# Patient Record
Sex: MALE | Race: WHITE | NOT HISPANIC OR LATINO | Employment: FULL TIME | RURAL
[De-identification: names, ages, dates, MRNs, and addresses within clinical notes are randomized per-mention and may not be internally consistent; named-entity substitution may affect disease eponyms.]

---

## 2022-05-18 ENCOUNTER — OFFICE VISIT (OUTPATIENT)
Dept: FAMILY MEDICINE CLINIC | Facility: CLINIC | Age: 72
End: 2022-05-18

## 2022-05-18 VITALS
BODY MASS INDEX: 26.88 KG/M2 | WEIGHT: 192 LBS | HEIGHT: 71 IN | SYSTOLIC BLOOD PRESSURE: 142 MMHG | DIASTOLIC BLOOD PRESSURE: 82 MMHG | OXYGEN SATURATION: 98 % | HEART RATE: 72 BPM

## 2022-05-18 DIAGNOSIS — E11.9 TYPE 2 DIABETES MELLITUS WITHOUT COMPLICATION, WITHOUT LONG-TERM CURRENT USE OF INSULIN: Primary | ICD-10-CM

## 2022-05-18 LAB — POC MICROALBUMIN URINE: 20

## 2022-05-18 PROCEDURE — 36415 COLL VENOUS BLD VENIPUNCTURE: CPT | Performed by: PHYSICIAN ASSISTANT

## 2022-05-18 PROCEDURE — 82044 UR ALBUMIN SEMIQUANTITATIVE: CPT | Performed by: PHYSICIAN ASSISTANT

## 2022-05-18 PROCEDURE — 99213 OFFICE O/P EST LOW 20 MIN: CPT | Performed by: PHYSICIAN ASSISTANT

## 2022-05-18 RX ORDER — METFORMIN HYDROCHLORIDE 500 MG/1
TABLET, EXTENDED RELEASE ORAL
COMMUNITY
Start: 2022-04-12 | End: 2022-05-18 | Stop reason: SDUPTHER

## 2022-05-18 RX ORDER — OMEPRAZOLE 20 MG/1
20 CAPSULE, DELAYED RELEASE ORAL DAILY
COMMUNITY
Start: 2022-04-13 | End: 2022-07-11

## 2022-05-18 RX ORDER — LANCETS
EACH MISCELLANEOUS
COMMUNITY
Start: 2022-05-13 | End: 2022-08-11

## 2022-05-18 RX ORDER — LISINOPRIL 5 MG/1
5 TABLET ORAL DAILY
COMMUNITY
Start: 2022-04-13 | End: 2022-07-13

## 2022-05-18 RX ORDER — BLOOD SUGAR DIAGNOSTIC
STRIP MISCELLANEOUS DAILY
COMMUNITY
Start: 2022-02-21 | End: 2022-09-26

## 2022-05-18 RX ORDER — METFORMIN HYDROCHLORIDE 500 MG/1
TABLET, EXTENDED RELEASE ORAL
Qty: 360 TABLET | Refills: 1 | Status: SHIPPED | OUTPATIENT
Start: 2022-05-18 | End: 2022-08-18 | Stop reason: SDUPTHER

## 2022-05-19 LAB
ALBUMIN SERPL-MCNC: 4.7 G/DL (ref 3.8–4.8)
ALBUMIN/GLOB SERPL: 2 {RATIO} (ref 1.2–2.2)
ALP SERPL-CCNC: 76 IU/L (ref 44–121)
ALT SERPL-CCNC: 43 IU/L (ref 0–44)
AST SERPL-CCNC: 45 IU/L (ref 0–40)
BILIRUB SERPL-MCNC: 2.3 MG/DL (ref 0–1.2)
BUN SERPL-MCNC: 8 MG/DL (ref 8–27)
BUN/CREAT SERPL: 10 (ref 10–24)
CALCIUM SERPL-MCNC: 9.8 MG/DL (ref 8.6–10.2)
CHLORIDE SERPL-SCNC: 102 MMOL/L (ref 96–106)
CO2 SERPL-SCNC: 22 MMOL/L (ref 20–29)
CREAT SERPL-MCNC: 0.83 MG/DL (ref 0.76–1.27)
EGFRCR SERPLBLD CKD-EPI 2021: 95 ML/MIN/1.73
GLOBULIN SER CALC-MCNC: 2.4 G/DL (ref 1.5–4.5)
GLUCOSE SERPL-MCNC: 159 MG/DL (ref 65–99)
HBA1C MFR BLD: 7.3 % (ref 4.8–5.6)
POTASSIUM SERPL-SCNC: 3.9 MMOL/L (ref 3.5–5.2)
PROT SERPL-MCNC: 7.1 G/DL (ref 6–8.5)
SODIUM SERPL-SCNC: 143 MMOL/L (ref 134–144)

## 2022-05-30 ENCOUNTER — TELEPHONE (OUTPATIENT)
Dept: FAMILY MEDICINE CLINIC | Facility: CLINIC | Age: 72
End: 2022-05-30

## 2022-05-30 NOTE — TELEPHONE ENCOUNTER
Please let him know that his 3-month sugar average has improved down to 7.3.  The goal is to get less than 7, so please continue to work on improving healthy low-carb diet and exercise with medication.  We will recheck again with his next labs in 3 months.  There also was increase to his bilirubin level at 2.3 normal is less than 1.2 and mild elevation to one  liver enzyme at 45 with normal less than 40.  Recommend to get in with GI for further evaluation on elevated bilirubin.  Please see whom would like to see.  Thanks.

## 2022-06-01 DIAGNOSIS — R17 ELEVATED BILIRUBIN: Primary | ICD-10-CM

## 2022-07-11 RX ORDER — OMEPRAZOLE 20 MG/1
CAPSULE, DELAYED RELEASE ORAL
Qty: 90 CAPSULE | Refills: 0 | Status: SHIPPED | OUTPATIENT
Start: 2022-07-11 | End: 2022-08-18 | Stop reason: SDUPTHER

## 2022-07-13 RX ORDER — LISINOPRIL 5 MG/1
TABLET ORAL
Qty: 90 TABLET | Refills: 0 | Status: SHIPPED | OUTPATIENT
Start: 2022-07-13 | End: 2022-08-18 | Stop reason: SDUPTHER

## 2022-07-29 ENCOUNTER — TELEPHONE (OUTPATIENT)
Dept: FAMILY MEDICINE CLINIC | Facility: CLINIC | Age: 72
End: 2022-07-29

## 2022-07-29 NOTE — TELEPHONE ENCOUNTER
[facility]  [facility]  [phone]    Insurance company contacted us. Said that they wanted to let providers know that they recommend Pt be placed on statin medication due to diabetes and age. Wanted a message left for PCP.

## 2022-08-11 RX ORDER — LANCETS
EACH MISCELLANEOUS
Qty: 100 EACH | Refills: 2 | Status: SHIPPED | OUTPATIENT
Start: 2022-08-11

## 2022-08-18 ENCOUNTER — OFFICE VISIT (OUTPATIENT)
Dept: FAMILY MEDICINE CLINIC | Facility: CLINIC | Age: 72
End: 2022-08-18

## 2022-08-18 VITALS
HEART RATE: 88 BPM | BODY MASS INDEX: 23.8 KG/M2 | HEIGHT: 71 IN | SYSTOLIC BLOOD PRESSURE: 126 MMHG | OXYGEN SATURATION: 99 % | WEIGHT: 170 LBS | DIASTOLIC BLOOD PRESSURE: 76 MMHG

## 2022-08-18 DIAGNOSIS — Z12.5 PROSTATE CANCER SCREENING: ICD-10-CM

## 2022-08-18 DIAGNOSIS — D53.1 MEGALOBLASTIC ANEMIA: ICD-10-CM

## 2022-08-18 DIAGNOSIS — E78.2 MIXED HYPERLIPIDEMIA: ICD-10-CM

## 2022-08-18 DIAGNOSIS — R53.83 FATIGUE, UNSPECIFIED TYPE: ICD-10-CM

## 2022-08-18 DIAGNOSIS — E11.9 TYPE 2 DIABETES MELLITUS WITHOUT COMPLICATION, WITHOUT LONG-TERM CURRENT USE OF INSULIN: ICD-10-CM

## 2022-08-18 DIAGNOSIS — E55.9 VITAMIN D DEFICIENCY: ICD-10-CM

## 2022-08-18 DIAGNOSIS — Z00.00 ROUTINE MEDICAL EXAM: Primary | ICD-10-CM

## 2022-08-18 PROCEDURE — 99397 PER PM REEVAL EST PAT 65+ YR: CPT | Performed by: PHYSICIAN ASSISTANT

## 2022-08-18 PROCEDURE — 36415 COLL VENOUS BLD VENIPUNCTURE: CPT | Performed by: PHYSICIAN ASSISTANT

## 2022-08-18 RX ORDER — OMEPRAZOLE 20 MG/1
20 CAPSULE, DELAYED RELEASE ORAL DAILY
Qty: 90 CAPSULE | Refills: 1 | Status: SHIPPED | OUTPATIENT
Start: 2022-08-18 | End: 2023-02-03 | Stop reason: SDUPTHER

## 2022-08-18 RX ORDER — METFORMIN HYDROCHLORIDE 500 MG/1
TABLET, EXTENDED RELEASE ORAL
Qty: 360 TABLET | Refills: 1 | Status: SHIPPED | OUTPATIENT
Start: 2022-08-18 | End: 2023-02-03

## 2022-08-18 RX ORDER — LISINOPRIL 5 MG/1
5 TABLET ORAL DAILY
Qty: 90 TABLET | Refills: 1 | Status: SHIPPED | OUTPATIENT
Start: 2022-08-18 | End: 2023-02-03 | Stop reason: SDUPTHER

## 2022-08-18 NOTE — PROGRESS NOTES
"Chief Complaint  Annual Exam (Yearly physical )    Subjective          Dakotah Verma presents to [facility]  Patient in today for routine physical exam along with medication recheck and refills.  He has been working on improving his diet and exercise these last several months.  He states he is pretty much cut out carbs and animal products.  He has lost about 20 pounds.  His sugars have shown significant improvement as well as he has been monitoring his blood pressure and it has been staying very well controlled.  He denies any chest pain or shortness of breath.  He did go see GI and made the decision to decline a colonoscopy at this time.  He denies any changes to bowel or urine habits.  States the omeprazole continues to work well for his reflux symptoms and would like to continue.    Heartburn  He reports no abdominal pain, no chest pain, no coughing, no nausea or no sore throat. Pertinent negatives include no fatigue.   Diabetes  He presents for his follow-up diabetic visit. He has type 2 diabetes mellitus. Pertinent negatives for hypoglycemia include no dizziness. Pertinent negatives for diabetes include no blurred vision, no chest pain, no fatigue, no polydipsia, no polyphagia and no polyuria.       Objective   Vital Signs:   /76   Pulse 88   Ht 180.3 cm (71\")   Wt 77.1 kg (170 lb)   SpO2 99%   BMI 23.71 kg/m²     Body mass index is 23.71 kg/m².    Review of Systems   Constitutional: Negative for fatigue and fever.   HENT: Negative for congestion and sore throat.    Eyes: Negative for blurred vision.   Respiratory: Negative for cough and shortness of breath.    Cardiovascular: Negative for chest pain.   Gastrointestinal: Negative for abdominal pain, blood in stool, diarrhea, nausea and vomiting.   Endocrine: Negative for polydipsia, polyphagia and polyuria.   Genitourinary: Negative for dysuria and frequency.   Neurological: Negative for dizziness and headache. "       Past History:  Medical History: has a past medical history of Backache, Diabetes mellitus (HCC), Drug therapy, Fatty liver (1997), GERD with esophagitis, High risk medication use, Megaloblastic anemia, Mixed hyperlipidemia due to type 2 diabetes mellitus (HCC), Prostatitis, Proteinuria due to type 2 diabetes mellitus (HCC), Sciatica, left side, Tonsillitis, Type 2 diabetes mellitus (HCC), and Vitamin D deficiency.   Surgical History: has a past surgical history that includes Back surgery; Adenoidectomy; Tonsillectomy; and Lumbar discectomy (10/30/2014).   Family History: family history includes Asthma in his sister; COPD in his father; Diabetes type II in his mother and sister; Hypertension in his mother; Mental illness in his mother; Other in his sister; Stroke in his mother.   Social History: reports that he has never smoked. He has never used smokeless tobacco. He reports that he does not drink alcohol and does not use drugs.      Current Outpatient Medications:   •  Accu-Chek Leah Plus test strip, Daily. for testing, Disp: , Rfl:   •  Accu-Chek Softclix Lancets lancets, CHECK BLOOD SUGAR EVERY DAY OR AS DIRECTED FOR DIABETES, Disp: 100 each, Rfl: 2  •  lisinopril (PRINIVIL,ZESTRIL) 5 MG tablet, Take 1 tablet by mouth Daily., Disp: 90 tablet, Rfl: 1  •  metFORMIN ER (GLUCOPHAGE-XR) 500 MG 24 hr tablet, Take 2 tablets by oral route in AM and 2 tablets by oral route in PM with meals, Disp: 360 tablet, Rfl: 1  •  omeprazole (priLOSEC) 20 MG capsule, Take 1 capsule by mouth Daily., Disp: 90 capsule, Rfl: 1  •  vitamin B-12 (CYANOCOBALAMIN) 1000 MCG tablet, Take 1,000 mcg by mouth daily., Disp: , Rfl:   Allergies: Sulfa antibiotics    Physical Exam  Constitutional:       Appearance: Normal appearance.   HENT:      Right Ear: Tympanic membrane normal.      Left Ear: Tympanic membrane normal.      Mouth/Throat:      Mouth: Mucous membranes are moist.   Eyes:      Pupils: Pupils are equal, round, and reactive to  light.   Cardiovascular:      Rate and Rhythm: Normal rate and regular rhythm.      Heart sounds: Normal heart sounds.   Pulmonary:      Effort: Pulmonary effort is normal.      Breath sounds: Normal breath sounds.   Abdominal:      Palpations: Abdomen is soft.   Neurological:      Mental Status: He is alert and oriented to person, place, and time.   Psychiatric:         Mood and Affect: Mood normal.         Behavior: Behavior normal.             Assessment and Plan   Diagnoses and all orders for this visit:    1. Routine medical exam (Primary)  Encouraged healthy diet and exercise.  We will check fasting labs today.  Return to clinic prior to recheck as needed.  2. Mixed hyperlipidemia  -     Comprehensive Metabolic Panel; Future  -     Lipid Panel; Future  Check fasting labs today.  Encouraged to continue with healthy diet and exercise.  3. Vitamin D deficiency  -     Vitamin D 25 Hydroxy; Future  Patient states has not been on vitamin D for the past 6 months.  4. Prostate cancer screening  -     PSA Screen; Future  PSA with labs today.  5. Type 2 diabetes mellitus without complication, without long-term current use of insulin (HCC)  -     Hemoglobin A1c; Future  Will check hemoglobin A1c today with labs.  His readings he has brought in for the last 2 months show that he has been averaging around 120 or better fasting.  6. Megaloblastic anemia  -     CBC & Differential; Future  -     Vitamin B12; Future  We will recheck B12 level today.  7. Fatigue, unspecified type  -     TSH; Future    Other orders  -     metFORMIN ER (GLUCOPHAGE-XR) 500 MG 24 hr tablet; Take 2 tablets by oral route in AM and 2 tablets by oral route in PM with meals  Dispense: 360 tablet; Refill: 1  -     omeprazole (priLOSEC) 20 MG capsule; Take 1 capsule by mouth Daily.  Dispense: 90 capsule; Refill: 1  -     lisinopril (PRINIVIL,ZESTRIL) 5 MG tablet; Take 1 tablet by mouth Daily.  Dispense: 90 tablet; Refill: 1            Follow Up   Return  for Medicare Wellness, schedule annual wellness with nurse.  Patient was given instructions and counseling regarding his condition or for health maintenance advice. Please see specific information pulled into the AVS if appropriate.     Nancy Coon PA-C

## 2022-08-19 LAB
25(OH)D3+25(OH)D2 SERPL-MCNC: 50.5 NG/ML (ref 30–100)
ALBUMIN SERPL-MCNC: 4.8 G/DL (ref 3.8–4.8)
ALBUMIN/GLOB SERPL: 1.7 {RATIO} (ref 1.2–2.2)
ALP SERPL-CCNC: 84 IU/L (ref 44–121)
ALT SERPL-CCNC: 31 IU/L (ref 0–44)
AST SERPL-CCNC: 35 IU/L (ref 0–40)
BASOPHILS # BLD AUTO: 0.1 X10E3/UL (ref 0–0.2)
BASOPHILS NFR BLD AUTO: 1 %
BILIRUB SERPL-MCNC: 2 MG/DL (ref 0–1.2)
BUN SERPL-MCNC: 12 MG/DL (ref 8–27)
BUN/CREAT SERPL: 13 (ref 10–24)
CALCIUM SERPL-MCNC: 10.1 MG/DL (ref 8.6–10.2)
CHLORIDE SERPL-SCNC: 99 MMOL/L (ref 96–106)
CHOLEST SERPL-MCNC: 143 MG/DL (ref 100–199)
CO2 SERPL-SCNC: 20 MMOL/L (ref 20–29)
CREAT SERPL-MCNC: 0.93 MG/DL (ref 0.76–1.27)
EGFRCR-CYS SERPLBLD CKD-EPI 2021: 89 ML/MIN/1.73
EOSINOPHIL # BLD AUTO: 0.1 X10E3/UL (ref 0–0.4)
EOSINOPHIL NFR BLD AUTO: 1 %
ERYTHROCYTE [DISTWIDTH] IN BLOOD BY AUTOMATED COUNT: 13.3 % (ref 11.6–15.4)
GLOBULIN SER CALC-MCNC: 2.9 G/DL (ref 1.5–4.5)
GLUCOSE SERPL-MCNC: 119 MG/DL (ref 65–99)
HBA1C MFR BLD: 6.6 % (ref 4.8–5.6)
HCT VFR BLD AUTO: 43.2 % (ref 37.5–51)
HDLC SERPL-MCNC: 43 MG/DL
HGB BLD-MCNC: 14.8 G/DL (ref 13–17.7)
IMM GRANULOCYTES # BLD AUTO: 0 X10E3/UL (ref 0–0.1)
IMM GRANULOCYTES NFR BLD AUTO: 0 %
LDLC SERPL CALC-MCNC: 79 MG/DL (ref 0–99)
LYMPHOCYTES # BLD AUTO: 1.9 X10E3/UL (ref 0.7–3.1)
LYMPHOCYTES NFR BLD AUTO: 23 %
MCH RBC QN AUTO: 32.2 PG (ref 26.6–33)
MCHC RBC AUTO-ENTMCNC: 34.3 G/DL (ref 31.5–35.7)
MCV RBC AUTO: 94 FL (ref 79–97)
MONOCYTES # BLD AUTO: 0.5 X10E3/UL (ref 0.1–0.9)
MONOCYTES NFR BLD AUTO: 6 %
NEUTROPHILS # BLD AUTO: 6 X10E3/UL (ref 1.4–7)
NEUTROPHILS NFR BLD AUTO: 69 %
PLATELET # BLD AUTO: 281 X10E3/UL (ref 150–450)
POTASSIUM SERPL-SCNC: 5.1 MMOL/L (ref 3.5–5.2)
PROT SERPL-MCNC: 7.7 G/DL (ref 6–8.5)
PSA SERPL-MCNC: 8.7 NG/ML (ref 0–4)
RBC # BLD AUTO: 4.6 X10E6/UL (ref 4.14–5.8)
SODIUM SERPL-SCNC: 142 MMOL/L (ref 134–144)
TRIGL SERPL-MCNC: 119 MG/DL (ref 0–149)
TSH SERPL DL<=0.005 MIU/L-ACNC: 2.63 UIU/ML (ref 0.45–4.5)
VIT B12 SERPL-MCNC: 486 PG/ML (ref 232–1245)
VLDLC SERPL CALC-MCNC: 21 MG/DL (ref 5–40)
WBC # BLD AUTO: 8.5 X10E3/UL (ref 3.4–10.8)

## 2022-08-29 DIAGNOSIS — R97.20 ELEVATED PSA: Primary | ICD-10-CM

## 2022-09-23 ENCOUNTER — OFFICE VISIT (OUTPATIENT)
Dept: UROLOGY | Facility: CLINIC | Age: 72
End: 2022-09-23

## 2022-09-23 VITALS — BODY MASS INDEX: 23.1 KG/M2 | HEIGHT: 71 IN | WEIGHT: 165 LBS

## 2022-09-23 DIAGNOSIS — R97.20 ELEVATED PROSTATE SPECIFIC ANTIGEN (PSA): Primary | ICD-10-CM

## 2022-09-23 PROCEDURE — 99204 OFFICE O/P NEW MOD 45 MIN: CPT | Performed by: STUDENT IN AN ORGANIZED HEALTH CARE EDUCATION/TRAINING PROGRAM

## 2022-09-23 NOTE — PROGRESS NOTES
"     Elevated PSA Office Visit      Patient Name: Dakotah Verma  : 1950   MRN: [id number]     Chief Complaint:  Elevated PSA.    Chief Complaint   Patient presents with   • Elevated PSA       Referring Provider: Nancy Coon PA-C    History of Present Illness: Dakotah Verma is a 71 y.o. male who presents today with history of elevated PSA.     He reports a history of \"prostatitis\" in his 20s and 40s, but \"has not had it since\". Previous symptoms included burning and penile discharge. These have resolved years ago.     Lab Results   Component Value Date    PSA 8.7 (H) 2022      Reports PSA 5.6 last year.  Reports he thinks PSA was in the 3's two years ago.   PSA records are limited.     Denies urinary symptoms concerns. Rare nocturia 1x nightly.     IPSS 1, minimal concerns.     He reports his paternal uncle \" of prostate cancer\".     Prostate Biopsy Collaborative Group (PBCG) Risk Calculator:          Subjective      Review of System: Review of Systems   Constitutional: Negative for chills, fatigue, fever and unexpected weight change.   HENT: Negative for sore throat.    Eyes: Negative for visual disturbance.   Respiratory: Negative for cough, chest tightness and shortness of breath.    Cardiovascular: Negative for chest pain and leg swelling.   Gastrointestinal: Negative for blood in stool, constipation, diarrhea, nausea, rectal pain and vomiting.   Genitourinary: Negative for decreased urine volume, difficulty urinating, dysuria, enuresis, flank pain, frequency, genital sores, hematuria and urgency.   Musculoskeletal: Negative for back pain and joint swelling.   Skin: Negative for rash and wound.   Neurological: Negative for seizures, speech difficulty, weakness and headaches.   Psychiatric/Behavioral: Negative for confusion, sleep disturbance and suicidal ideas. The patient is not nervous/anxious.       I have reviewed the ROS documented by my clinical staff, I have updated appropriately " and I agree. Evaristo Up MD    Past Medical History:   Past Medical History:   Diagnosis Date   • Backache    • Diabetes mellitus (HCC)    • Drug therapy    • Fatty liver 1997    ULTRASOUND   • GERD with esophagitis    • High risk medication use    • Megaloblastic anemia    • Mixed hyperlipidemia due to type 2 diabetes mellitus (HCC)    • Prostatitis    • Proteinuria due to type 2 diabetes mellitus (HCC)    • Sciatica, left side    • Tonsillitis    • Type 2 diabetes mellitus (HCC)    • Vitamin D deficiency        Past Surgical History:   Past Surgical History:   Procedure Laterality Date   • ADENOIDECTOMY     • BACK SURGERY     • LUMBAR DISCECTOMY  10/30/2014   • TONSILLECTOMY         Family History:   Family History   Problem Relation Age of Onset   • Diabetes type II Mother    • Hypertension Mother         PULMONARY   • Stroke Mother    • Mental illness Mother    • COPD Father    • Diabetes type II Sister    • Asthma Sister    • Other Sister         SPINAL BIFIDA       Social History:   Social History     Socioeconomic History   • Marital status:    Tobacco Use   • Smoking status: Never Smoker   • Smokeless tobacco: Never Used   Vaping Use   • Vaping Use: Never used   Substance and Sexual Activity   • Alcohol use: No   • Drug use: No   • Sexual activity: Not Currently       Medications:     Current Outpatient Medications:   •  Accu-Chek Leah Plus test strip, Daily. for testing, Disp: , Rfl:   •  Accu-Chek Softclix Lancets lancets, CHECK BLOOD SUGAR EVERY DAY OR AS DIRECTED FOR DIABETES, Disp: 100 each, Rfl: 2  •  lisinopril (PRINIVIL,ZESTRIL) 5 MG tablet, Take 1 tablet by mouth Daily., Disp: 90 tablet, Rfl: 1  •  metFORMIN ER (GLUCOPHAGE-XR) 500 MG 24 hr tablet, Take 2 tablets by oral route in AM and 2 tablets by oral route in PM with meals, Disp: 360 tablet, Rfl: 1  •  omeprazole (priLOSEC) 20 MG capsule, Take 1 capsule by mouth Daily., Disp: 90 capsule, Rfl: 1  •  vitamin B-12 (CYANOCOBALAMIN)  1000 MCG tablet, Take 1,000 mcg by mouth daily., Disp: , Rfl:     Allergies:   Allergies   Allergen Reactions   • Sulfa Antibiotics        IPSS Questionnaire (AUA-7):  Over the past month…    1)  Incomplete Emptying:       How often have you had a sensation of not emptying you had the sensation of not emptying your bladder completely after you finished urinating?  0 - Not at all   2)  Frequency:       How often have you had the urinate again less than two hours after you finished urinating?  0 - Not at all   3)  Intermittency:       How often have you found you stopped and started again several times when you urinated?   0 - Not at all   4) Urgency:      How often have you found it difficult to postpone urination?  0 - Not at all   5) Weak Stream:      How often have you had a weak urinary stream?  0 - Not at all   6) Straining:       How often have you had to push or strain to begin urination?  0 - Not at all   7) Nocturia:      How many times did you most typically get up to urinate from the time you went to bed at night until the time you got up in the morning?  1 - 1 time   Total Score:  1   The International Prostate Symptom Score (IPSS) is used to screen, diagnose, track symptoms of benign prostatic hyperplasia (BPH).   0-7 (Mild Symptoms) 8-19 (Moderate) 20-35 (Severe)   Quality of Life (QoL):  If you were to spend the rest of your life with your urinary condition just the way it is now, how would you feel about that? 0-Delighted   Urine Leakage (Incontinence) 0-No Leakage       Sexual Health Inventory for Men (ROHITH)   Over the past 6 months:     1. How do you rate your confidence that you could get and keep an erection?  0 - No sexual activity    2. When you had erections with sexual    stimulation, how often were your erections hard enough for penetration (entering your partner)?  0 - No Sexual Activity    3. During sexual intercourse, how often were you able to maintain your erection after you had  "penetrated (entered) your partner?  0 - Did not attempt intercourse   4. During sexual intercourse, how difficult was it to maintain your erection to completion of intercourse?  0 - Did not attempt intercourse   5. When you attempted sexual intercourse, how often was it satisfactory for you?  0 - Did not attempt intercourse    Total Score: 0   The Sexual Health Inventory for Men further classifies ED severity with the following breakpoints:   1-7 (Severe ED) 8-11 (Moderate ED) 12-16 (Mild to Moderate ED) 17-21 (Mild ED)             Objective     Physical Exam:   Vital Signs:   Vitals:    09/23/22 1055   Weight: 74.8 kg (165 lb)   Height: 180.3 cm (71\")     Body mass index is 23.01 kg/m².     Physical Exam  Constitutional:       Appearance: Normal appearance.   HENT:      Head: Normocephalic and atraumatic.      Nose: Nose normal.   Eyes:      Extraocular Movements: Extraocular movements intact.      Conjunctiva/sclera: Conjunctivae normal.      Pupils: Pupils are equal, round, and reactive to light.   Genitourinary:     Comments: Circumcised phallus, orthotopic meatus, bilaterally descended testicles without masses or lesions.  Digital rectal examination with normal tone, no blood, no obvious induration or nodules present.  Musculoskeletal:         General: Normal range of motion.      Cervical back: Normal range of motion and neck supple.   Skin:     General: Skin is warm and dry.      Findings: No lesion or rash.   Neurological:      General: No focal deficit present.      Mental Status: He is alert and oriented to person, place, and time. Mental status is at baseline.   Psychiatric:         Mood and Affect: Mood normal.         Behavior: Behavior normal.         Labs:   Hematocrit (%)   Date Value   08/18/2022 43.2   07/08/2016 41.3   07/08/2016 40.3   10/27/2014 46.3       Lab Results   Component Value Date    PSA 8.7 (H) 08/18/2022       Images:   No Images in the past 120 days found..    Measures:   Tobacco: " "  Dakotah Verma  reports that he has never smoked. He has never used smokeless tobacco.        Assessment / Plan      Assessment/Plan:   Mr. Verma is a 71 y.o. male with elevated PSA.    I had a detailed discussion with the patient today regarding prostate-specific antigen (PSA) and prostate cancer screening.  We discussed that PSA is an imperfect screening test and that it can be elevated for a variety of reasons including infection, inflammation, as a function of the prostate volume, and due to malignancy.  We discussed how prostate cancer is the most commonly diagnosed malignancy among men and becomes more prevalent at advanced age.  We discussed how patients with a family history of prostate cancer are at an increased risk of developing prostate cancer over the general population.  I counseled the patient that the American Urological Association guidelines recommend PSA screening in men aged 55 through 70, or in some instances at an earlier age if positive family history for prostate cancer.  I discussed how screening men older than 70 years old is not recommended, unless a patient has a greater than 10-year life expectancy, is in good health, and is personally motivated to rule out prostate cancer; this is due to the fact that most men older than 70, in poor health, and in those men with less than 10-year life expectancy will likely die of unrelated causes not associated with prostate cancer.  We talked about how prostate cancer is typically a slow-growing malignancy, but identifying intermediate or high risk prostate cancers that need prompt treatment is the ultimate goal of PSA and prostate cancer screening.  I discussed with this patient that there is no \"normal\" PSA range despite the fact that most labs indicate a \"normal\" PSA range from 0 to 4 ng/dL.  There are age-adjusted PSA ranges that are also taken into account in the setting of slightly elevated PSA in the older male.  Besides age-adjusted ranges, " calculating a patient's prostate volume to determine PSA density (<0.15 has a lower risk of harboring malignancy), calculating the patient's PSA velocity or doubling time, and evaluating free PSA versus total PSA values can help guide our decision making.  I also discussed the possibility of performing adjunctive blood tests as well, my preference is to perform a 4K score which can provide a percentage risk of harboring intermediate or high risk prostate cancers that may need treatment.  This is sometimes beneficial in assisting with decision-making in patients who are hesitant to undergo prostate biopsy.  I also discussed my goal is to work-up the appropriate patient for elevated PSA as prostate biopsy and work-up for prostate cancer has inherent risks and potential harms.  The risk of prostate biopsy related sepsis is 4% for all comers.    In short, I discussed that PSA screening and work-up for prostate cancer should only ever occur after shared decision making conversation between the physician and patient.  Patient reports understanding.    Discussion summary  I am a bit concerned about the patient's PSA trend per his report.  PSA records are limited.  No obvious induration or nodules present.  Discussed options including upfront biopsy, 4K score, select MDX urinary screening, possible MRI prostate for further evaluation.  Patient is interested in select MDX urinary prostate cancer screening and I will call him with results and next steps.    Diagnoses and all orders for this visit:    1. Elevated prostate specific antigen (PSA) (Primary)     - SelectM[facility]X urinary screening test for PCa, will call with results          Follow Up:   No follow-ups on file.    I spent approximately 30 minutes providing clinical care for this patient; including review of patient's chart and provider documentation, face to face time spent with patient in examination room (obtaining history, performing physical exam, discussing  diagnosis and management options), placing orders, and completing patient documentation.     Evaristo Up MD  [facility] [place]

## 2022-09-26 ENCOUNTER — TELEPHONE (OUTPATIENT)
Dept: UROLOGY | Facility: CLINIC | Age: 72
End: 2022-09-26

## 2022-09-26 DIAGNOSIS — R97.20 ELEVATED PROSTATE SPECIFIC ANTIGEN (PSA): Primary | ICD-10-CM

## 2022-09-26 RX ORDER — BLOOD SUGAR DIAGNOSTIC
STRIP MISCELLANEOUS
Qty: 100 EACH | Refills: 3 | Status: SHIPPED | OUTPATIENT
Start: 2022-09-26

## 2022-09-26 NOTE — TELEPHONE ENCOUNTER
I called the patient with results of select MDX urinary prostate cancer screening test, this indicates 66% chance of finding any prostate cancer and a 38% chance of finding clinically meaningful [place] 7 or higher prostate cancer.  He was offered upfront biopsy versus MRI for possible [facility] MRI-ultrasound fusion prostate biopsy in the operating room under anesthesia.  The patient would like to proceed with MRI prostate for further risk characterization.    Plan  - MRI prostate with [employer] CAD software for possible fusion ultrasound/MRI prostate biopsy, please schedule at [facility] next avaialable  - will call with results    Evaristo Up MD

## 2022-10-22 ENCOUNTER — TELEPHONE (OUTPATIENT)
Dept: UROLOGY | Facility: CLINIC | Age: 72
End: 2022-10-22

## 2022-10-22 ENCOUNTER — PREP FOR SURGERY (OUTPATIENT)
Dept: OTHER | Facility: HOSPITAL | Age: 72
End: 2022-10-22

## 2022-10-22 DIAGNOSIS — R97.20 ELEVATED PROSTATE SPECIFIC ANTIGEN (PSA): Primary | ICD-10-CM

## 2022-10-22 RX ORDER — CIPROFLOXACIN 500 MG/1
500 TABLET, FILM COATED ORAL 2 TIMES DAILY
Qty: 6 TABLET | Refills: 0 | Status: SHIPPED | OUTPATIENT
Start: 2022-11-03 | End: 2022-11-06

## 2022-10-22 RX ORDER — CEFAZOLIN SODIUM 2 G/100ML
2 INJECTION, SOLUTION INTRAVENOUS ONCE
Status: CANCELLED | OUTPATIENT
Start: 2022-10-22 | End: 2022-10-22

## 2022-10-22 NOTE — TELEPHONE ENCOUNTER
I called the patient back with results of MRI prostate, this demonstrates PI-RADS 4 lesion in the left peripheral zone and PI-RADS 3 lesion in the right transition zone.  We will plan for MRI/ultrasound fusion biopsy in the main operating room 11-4-2022.  I will send ciprofloxacin to his pharmacy.  We discussed risk benefits and alternatives including bleeding in the stool, rectum, urine, ejaculate and sepsis 2 to 3% risk.    PLAN  - MRI/US fusion prostate biopsy 11/4/22 Main OR     MD Evaristo Quesada MD

## 2022-11-02 ENCOUNTER — PRE-ADMISSION TESTING (OUTPATIENT)
Dept: PREADMISSION TESTING | Facility: HOSPITAL | Age: 72
End: 2022-11-02

## 2022-11-02 VITALS — HEIGHT: 71 IN | BODY MASS INDEX: 23.38 KG/M2 | WEIGHT: 167 LBS

## 2022-11-02 DIAGNOSIS — R97.20 ELEVATED PROSTATE SPECIFIC ANTIGEN (PSA): ICD-10-CM

## 2022-11-02 LAB
ANION GAP SERPL CALCULATED.3IONS-SCNC: 13 MMOL/L (ref 5–15)
BUN SERPL-MCNC: 12 MG/DL (ref 8–23)
BUN/CREAT SERPL: 17.1 (ref 7–25)
CALCIUM SPEC-SCNC: 9.9 MG/DL (ref 8.6–10.5)
CHLORIDE SERPL-SCNC: 103 MMOL/L (ref 98–107)
CO2 SERPL-SCNC: 25 MMOL/L (ref 22–29)
CREAT SERPL-MCNC: 0.7 MG/DL (ref 0.76–1.27)
DEPRECATED RDW RBC AUTO: 46.3 FL (ref 37–54)
EGFRCR SERPLBLD CKD-EPI 2021: 99.1 ML/MIN/1.73
ERYTHROCYTE [DISTWIDTH] IN BLOOD BY AUTOMATED COUNT: 13 % (ref 12.3–15.4)
GLUCOSE SERPL-MCNC: 116 MG/DL (ref 65–99)
HBA1C MFR BLD: 5.8 % (ref 4.8–5.6)
HCT VFR BLD AUTO: 44.3 % (ref 37.5–51)
HGB BLD-MCNC: 14.8 G/DL (ref 13–17.7)
MCH RBC QN AUTO: 32.3 PG (ref 26.6–33)
MCHC RBC AUTO-ENTMCNC: 33.4 G/DL (ref 31.5–35.7)
MCV RBC AUTO: 96.7 FL (ref 79–97)
PLATELET # BLD AUTO: 299 10*3/MM3 (ref 140–450)
PMV BLD AUTO: 10.2 FL (ref 6–12)
POTASSIUM SERPL-SCNC: 4.4 MMOL/L (ref 3.5–5.2)
QT INTERVAL: 378 MS
QTC INTERVAL: 399 MS
RBC # BLD AUTO: 4.58 10*6/MM3 (ref 4.14–5.8)
SODIUM SERPL-SCNC: 141 MMOL/L (ref 136–145)
WBC NRBC COR # BLD: 8.96 10*3/MM3 (ref 3.4–10.8)

## 2022-11-02 PROCEDURE — 85027 COMPLETE CBC AUTOMATED: CPT

## 2022-11-02 PROCEDURE — 80048 BASIC METABOLIC PNL TOTAL CA: CPT

## 2022-11-02 PROCEDURE — 93005 ELECTROCARDIOGRAM TRACING: CPT

## 2022-11-02 PROCEDURE — 83036 HEMOGLOBIN GLYCOSYLATED A1C: CPT

## 2022-11-02 PROCEDURE — 36415 COLL VENOUS BLD VENIPUNCTURE: CPT

## 2022-11-02 PROCEDURE — 93010 ELECTROCARDIOGRAM REPORT: CPT | Performed by: STUDENT IN AN ORGANIZED HEALTH CARE EDUCATION/TRAINING PROGRAM

## 2022-11-02 NOTE — PAT
Per Anesthesia Request, patient instructed not to take their ACE/ARB medications on the AM of surgery.  (does not apply, patient takes lisinopril in evenings)    Patient instructed to drink 20 ounces of Gatorade and it needs to be completed 1 hour (for Main OR patients) or 2 hours (scheduled  section & [facility] patients) before given arrival time for procedure (NO RED Gatorade)    Patient verbalized understanding.    Consent signed in PAT.    EKG performed in PAT.

## 2022-11-03 ENCOUNTER — ANESTHESIA EVENT (OUTPATIENT)
Dept: PERIOP | Facility: HOSPITAL | Age: 72
End: 2022-11-03

## 2022-11-03 RX ORDER — FAMOTIDINE 10 MG/ML
20 INJECTION, SOLUTION INTRAVENOUS ONCE
Status: CANCELLED | OUTPATIENT
Start: 2022-11-03 | End: 2022-11-03

## 2022-11-03 RX ORDER — SODIUM CHLORIDE 0.9 % (FLUSH) 0.9 %
10 SYRINGE (ML) INJECTION AS NEEDED
Status: CANCELLED | OUTPATIENT
Start: 2022-11-03

## 2022-11-03 RX ORDER — SODIUM CHLORIDE 0.9 % (FLUSH) 0.9 %
10 SYRINGE (ML) INJECTION EVERY 12 HOURS SCHEDULED
Status: CANCELLED | OUTPATIENT
Start: 2022-11-03

## 2022-11-04 ENCOUNTER — HOSPITAL ENCOUNTER (OUTPATIENT)
Facility: HOSPITAL | Age: 72
Setting detail: HOSPITAL OUTPATIENT SURGERY
Discharge: HOME | End: 2022-11-04
Attending: STUDENT IN AN ORGANIZED HEALTH CARE EDUCATION/TRAINING PROGRAM | Admitting: STUDENT IN AN ORGANIZED HEALTH CARE EDUCATION/TRAINING PROGRAM

## 2022-11-04 ENCOUNTER — ANESTHESIA (OUTPATIENT)
Dept: PERIOP | Facility: HOSPITAL | Age: 72
End: 2022-11-04

## 2022-11-04 VITALS
TEMPERATURE: 97.3 F | DIASTOLIC BLOOD PRESSURE: 68 MMHG | OXYGEN SATURATION: 97 % | SYSTOLIC BLOOD PRESSURE: 112 MMHG | HEIGHT: 71 IN | WEIGHT: 160 LBS | RESPIRATION RATE: 16 BRPM | BODY MASS INDEX: 22.4 KG/M2 | HEART RATE: 68 BPM

## 2022-11-04 DIAGNOSIS — R97.20 ELEVATED PROSTATE SPECIFIC ANTIGEN (PSA): ICD-10-CM

## 2022-11-04 LAB — GLUCOSE BLDC GLUCOMTR-MCNC: 97 MG/DL (ref 70–130)

## 2022-11-04 PROCEDURE — 25010000002 PROPOFOL 10 MG/ML EMULSION: Performed by: NURSE ANESTHETIST, CERTIFIED REGISTERED

## 2022-11-04 PROCEDURE — 25010000002 DEXAMETHASONE PER 1 MG: Performed by: NURSE ANESTHETIST, CERTIFIED REGISTERED

## 2022-11-04 PROCEDURE — 82962 GLUCOSE BLOOD TEST: CPT

## 2022-11-04 PROCEDURE — G0416 PROSTATE BIOPSY, ANY MTHD: HCPCS | Performed by: STUDENT IN AN ORGANIZED HEALTH CARE EDUCATION/TRAINING PROGRAM

## 2022-11-04 PROCEDURE — 76942 ECHO GUIDE FOR BIOPSY: CPT | Performed by: STUDENT IN AN ORGANIZED HEALTH CARE EDUCATION/TRAINING PROGRAM

## 2022-11-04 PROCEDURE — 55700 PR PROSTATE NEEDLE BIOPSY ANY APPROACH: CPT | Performed by: STUDENT IN AN ORGANIZED HEALTH CARE EDUCATION/TRAINING PROGRAM

## 2022-11-04 RX ORDER — FENTANYL CITRATE 50 UG/ML
50 INJECTION, SOLUTION INTRAMUSCULAR; INTRAVENOUS
Status: DISCONTINUED | OUTPATIENT
Start: 2022-11-04 | End: 2022-11-04 | Stop reason: HOSPADM

## 2022-11-04 RX ORDER — PHENYLEPHRINE HCL IN 0.9% NACL 1 MG/10 ML
SYRINGE (ML) INTRAVENOUS AS NEEDED
Status: DISCONTINUED | OUTPATIENT
Start: 2022-11-04 | End: 2022-11-04 | Stop reason: SURG

## 2022-11-04 RX ORDER — ESMOLOL HYDROCHLORIDE 10 MG/ML
INJECTION INTRAVENOUS AS NEEDED
Status: DISCONTINUED | OUTPATIENT
Start: 2022-11-04 | End: 2022-11-04 | Stop reason: SURG

## 2022-11-04 RX ORDER — MAGNESIUM HYDROXIDE 1200 MG/15ML
LIQUID ORAL AS NEEDED
Status: DISCONTINUED | OUTPATIENT
Start: 2022-11-04 | End: 2022-11-04 | Stop reason: HOSPADM

## 2022-11-04 RX ORDER — CEFAZOLIN SODIUM IN 0.9 % NACL 2 G/100 ML
2 PLASTIC BAG, INJECTION (ML) INTRAVENOUS ONCE
Status: COMPLETED | OUTPATIENT
Start: 2022-11-04 | End: 2022-11-04

## 2022-11-04 RX ORDER — PROPOFOL 10 MG/ML
VIAL (ML) INTRAVENOUS AS NEEDED
Status: DISCONTINUED | OUTPATIENT
Start: 2022-11-04 | End: 2022-11-04 | Stop reason: SURG

## 2022-11-04 RX ORDER — SODIUM CHLORIDE, SODIUM LACTATE, POTASSIUM CHLORIDE, CALCIUM CHLORIDE 600; 310; 30; 20 MG/100ML; MG/100ML; MG/100ML; MG/100ML
9 INJECTION, SOLUTION INTRAVENOUS CONTINUOUS
Status: DISCONTINUED | OUTPATIENT
Start: 2022-11-04 | End: 2022-11-04 | Stop reason: HOSPADM

## 2022-11-04 RX ORDER — DOCUSATE SODIUM 100 MG/1
100 CAPSULE, LIQUID FILLED ORAL 2 TIMES DAILY
COMMUNITY

## 2022-11-04 RX ORDER — HYDROMORPHONE HYDROCHLORIDE 1 MG/ML
0.5 INJECTION, SOLUTION INTRAMUSCULAR; INTRAVENOUS; SUBCUTANEOUS
Status: DISCONTINUED | OUTPATIENT
Start: 2022-11-04 | End: 2022-11-04 | Stop reason: HOSPADM

## 2022-11-04 RX ORDER — LIDOCAINE HYDROCHLORIDE 10 MG/ML
INJECTION, SOLUTION EPIDURAL; INFILTRATION; INTRACAUDAL; PERINEURAL AS NEEDED
Status: DISCONTINUED | OUTPATIENT
Start: 2022-11-04 | End: 2022-11-04 | Stop reason: SURG

## 2022-11-04 RX ORDER — MIDAZOLAM HYDROCHLORIDE 1 MG/ML
0.5 INJECTION INTRAMUSCULAR; INTRAVENOUS
Status: DISCONTINUED | OUTPATIENT
Start: 2022-11-04 | End: 2022-11-04 | Stop reason: HOSPADM

## 2022-11-04 RX ORDER — LIDOCAINE HYDROCHLORIDE AND EPINEPHRINE 10; 10 MG/ML; UG/ML
INJECTION, SOLUTION INFILTRATION; PERINEURAL AS NEEDED
Status: DISCONTINUED | OUTPATIENT
Start: 2022-11-04 | End: 2022-11-04 | Stop reason: HOSPADM

## 2022-11-04 RX ORDER — FAMOTIDINE 20 MG/1
20 TABLET, FILM COATED ORAL ONCE
Status: COMPLETED | OUTPATIENT
Start: 2022-11-04 | End: 2022-11-04

## 2022-11-04 RX ORDER — DEXAMETHASONE SODIUM PHOSPHATE 4 MG/ML
INJECTION, SOLUTION INTRA-ARTICULAR; INTRALESIONAL; INTRAMUSCULAR; INTRAVENOUS; SOFT TISSUE AS NEEDED
Status: DISCONTINUED | OUTPATIENT
Start: 2022-11-04 | End: 2022-11-04 | Stop reason: SURG

## 2022-11-04 RX ORDER — LIDOCAINE HYDROCHLORIDE 10 MG/ML
0.5 INJECTION, SOLUTION EPIDURAL; INFILTRATION; INTRACAUDAL; PERINEURAL ONCE AS NEEDED
Status: COMPLETED | OUTPATIENT
Start: 2022-11-04 | End: 2022-11-04

## 2022-11-04 RX ADMIN — Medication 200 MCG: at 14:22

## 2022-11-04 RX ADMIN — Medication 200 MCG: at 14:37

## 2022-11-04 RX ADMIN — FAMOTIDINE 20 MG: 20 TABLET ORAL at 12:57

## 2022-11-04 RX ADMIN — DEXAMETHASONE SODIUM PHOSPHATE 4 MG: 4 INJECTION, SOLUTION INTRAMUSCULAR; INTRAVENOUS at 14:14

## 2022-11-04 RX ADMIN — PROPOFOL 50 MCG/KG/MIN: 10 INJECTION, EMULSION INTRAVENOUS at 14:08

## 2022-11-04 RX ADMIN — PROPOFOL 50 MG: 10 INJECTION, EMULSION INTRAVENOUS at 14:08

## 2022-11-04 RX ADMIN — LIDOCAINE HYDROCHLORIDE 50 MG: 10 INJECTION, SOLUTION EPIDURAL; INFILTRATION; INTRACAUDAL; PERINEURAL at 14:08

## 2022-11-04 RX ADMIN — Medication 100 MCG: at 14:31

## 2022-11-04 RX ADMIN — CEFAZOLIN 2 G: 10 INJECTION, POWDER, FOR SOLUTION INTRAVENOUS at 14:05

## 2022-11-04 RX ADMIN — ESMOLOL HYDROCHLORIDE 5 MG: 10 INJECTION, SOLUTION INTRAVENOUS at 14:08

## 2022-11-04 RX ADMIN — LIDOCAINE HYDROCHLORIDE 0.5 ML: 10 INJECTION, SOLUTION EPIDURAL; INFILTRATION; INTRACAUDAL; PERINEURAL at 12:35

## 2022-11-04 RX ADMIN — SODIUM CHLORIDE, POTASSIUM CHLORIDE, SODIUM LACTATE AND CALCIUM CHLORIDE 9 ML/HR: 600; 310; 30; 20 INJECTION, SOLUTION INTRAVENOUS at 12:35

## 2022-11-04 NOTE — H&P
Pre-Op H&P  Dakotah Verma  [id number]  1950      Chief complaint: Increased PSA       Subjective:  Patient is a 72 y.o.male presents for scheduled surgery by Dr. Up. He anticipates a PROSTATE ULTRASOUND BIOPSY MRI FUSION WITH URONAV today. He is followed for elevated PSA levels. He denies urinary symptoms.      Review of Systems:  Constitutional-- No fever, chills or sweats. + fatigue.  CV-- No chest pain, palpitation or syncope. +HTN  Resp-- No SOB, cough, hemoptysis  Skin--No rashes or lesions      Allergies:   Allergies   Allergen Reactions   • Sulfa Antibiotics Hives and Itching         Home Meds:  Medications Prior to Admission   Medication Sig Dispense Refill Last Dose   • ciprofloxacin (Cipro) 500 MG tablet Take 1 tablet by mouth 2 (Two) Times a Day for 3 days. Start taking one day prior to prostate biopsy 6 tablet 0 11/4/2022 at 0930   • docusate sodium (COLACE) 100 MG capsule Take 1 capsule by mouth 2 (Two) Times a Day.   11/3/2022 at 1000   • lisinopril (PRINIVIL,ZESTRIL) 5 MG tablet Take 1 tablet by mouth Daily. (Patient taking differently: Take 1 tablet by mouth Every Evening.) 90 tablet 1 11/3/2022 at 1900   • metFORMIN ER (GLUCOPHAGE-XR) 500 MG 24 hr tablet Take 2 tablets by oral route in AM and 2 tablets by oral route in PM with meals 360 tablet 1 11/3/2022 at 1900   • omeprazole (priLOSEC) 20 MG capsule Take 1 capsule by mouth Daily. 90 capsule 1 11/3/2022 at 1000   • vitamin B-12 (CYANOCOBALAMIN) 1000 MCG tablet Take 1,000 mcg by mouth daily.   11/3/2022 at 1000   • Accu-Chek Leah Plus test strip TEST ONCE DAILY 100 each 3    • Accu-Chek Softclix Lancets lancets CHECK BLOOD SUGAR EVERY DAY OR AS DIRECTED FOR DIABETES 100 each 2          PMH:   Past Medical History:   Diagnosis Date   • DM (diabetes mellitus) (HCC)    • GERD with esophagitis    • Nonalcoholic fatty liver disease    • Prostatitis    • Sciatica, left side    • Speech abnormality     Pt has stuttered as a child,  "sometimes  has some trouble getting words out.   • Tonsillitis    • Vitamin D deficiency      PSH:    Past Surgical History:   Procedure Laterality Date   • ADENOIDECTOMY     • COLONOSCOPY     • LUMBAR DISCECTOMY  10/30/2014   • TONSILLECTOMY         Immunization History:  Influenza: 2022  Pneumococcal: UTD  Tetanus: UTD  Covid x3: 2022    Social History:   Tobacco:   Social History     Tobacco Use   Smoking Status Never   Smokeless Tobacco Never      Alcohol:     Social History     Substance and Sexual Activity   Alcohol Use No         Physical Exam:/78 (BP Location: Right arm, Patient Position: Lying)   Pulse 77   Temp 97.2 °F (36.2 °C) (Temporal)   Resp 16   Ht 180.3 cm (71\")   Wt 72.6 kg (160 lb)   SpO2 100%   BMI 22.32 kg/m²       General Appearance:    Alert, cooperative, no distress, appears stated age   Head:    Normocephalic, without obvious abnormality, atraumatic   Lungs:     Clear to auscultation bilaterally, respirations unlabored    Heart:   Regular rate and rhythm, S1 and S2 normal    Abdomen:    Soft without tenderness   Extremities:   Extremities normal, atraumatic, no cyanosis or edema   Skin:   Skin color, texture, turgor normal, no rashes or lesions   Neurologic:   Grossly intact     Results Review:     LABS:  Lab Results   Component Value Date    WBC 8.96 11/02/2022    HGB 14.8 11/02/2022    HCT 44.3 11/02/2022    MCV 96.7 11/02/2022     11/02/2022    NEUTROABS 6.0 08/18/2022    GLUCOSE 116 (H) 11/02/2022    BUN 12 11/02/2022    CREATININE 0.70 (L) 11/02/2022    EGFRIFNONA 98 07/08/2016     11/02/2022    K 4.4 11/02/2022     11/02/2022    CO2 25.0 11/02/2022    CALCIUM 9.9 11/02/2022    ALBUMIN 4.8 08/18/2022    AST 35 08/18/2022    ALT 31 08/18/2022    BILITOT 2.0 (H) 08/18/2022      Latest Reference Range & Units 08/18/22 10:21   PSA 0.0 - 4.0 ng/mL 8.7 (H)   (H): Data is abnormally high    RADIOLOGY:  Imaging Results (Last 72 Hours)     ** No results found for " the last 72 hours. **          I reviewed the patient's new clinical results.    Cancer Staging (if applicable)  Cancer Patient: __ yes __no __unknown; If yes, clinical stage T:__ N:__M:__, stage group or __N/A      Impression: Elevated prostate specific antigen (PSA)      Plan: PROSTATE ULTRASOUND BIOPSY MRI FUSION WITH URONAV      YAKOV Dowling   11/4/2022   12:51 EDT

## 2022-11-04 NOTE — BRIEF OP NOTE
PROSTATE ULTRASOUND BIOPSY MRI FUSION WITH URONAV  Progress Note    Dakotah Verma  11/4/2022    Pre-op Diagnosis:   Elevated prostate specific antigen (PSA) [R97.20]       Post-Op Diagnosis Codes:     * Elevated prostate specific antigen (PSA) [R97.20]    Procedure(s):  PROSTATE ULTRASOUND BIOPSY MRI FUSION WITH URONAV        Surgeon(s):  Evaristo Up MD    Anesthesia: Choice    Staff:   Circulator: Anabella Steinberg RN  Scrub Person: Royce Muir RN; Dipti Parker         Estimated Blood Loss: none    Urine Voided: * No values recorded between 11/4/2022  2:01 PM and 11/4/2022  2:42 PM *    Specimens:                Specimens     ID Source Type Tests Collected By Collected At Frozen?    A Prostate Tissue · TISSUE PATHOLOGY EXAM   Evaristo Up MD 11/4/22 1207 No    Description: PROSTATE FOR PERMANENT: target 1    Comment: PROSTATE FOR PERMANENT: target 1    This specimen was not marked as sent.    B Prostate Tissue · TISSUE PATHOLOGY EXAM   Evaristo Up MD 11/4/22 1418     Description: PROSTATE FOR PERMANENT: target 2    Comment: PROSTATE FOR PERMANENT target 2    This specimen was not marked as sent.    C Prostate Tissue · TISSUE PATHOLOGY EXAM   Evaristo Up MD 11/4/22 1418 No    Description: PROSTATE Left base FOR PERMANENT    Comment: PROSTATE Left base FOR PERMANENT    This specimen was not marked as sent.    D Prostate Tissue · TISSUE PATHOLOGY EXAM   Evaristo Up MD 11/4/22 1419 No    Description: PROSTATE: LEFT MID FOR PERMANENT    Comment: PROSTATE: LEFT MID FOR PERMANENT    This specimen was not marked as sent.    E Prostate Tissue · TISSUE PATHOLOGY EXAM   Evaristo Up MD 11/4/22 1419 No    Description: PROSTATE: LEFT APEX FOR PERMANENT    Comment: PROSTATE: LEFT APEX FOR PERMANENT    This specimen was not marked as sent.    F Prostate Tissue · TISSUE PATHOLOGY EXAM   Evaristo Up MD 11/4/22 1419 No    Description: PROSTATE RIGHT BASE FOR  PERMANENT    Comment: PROSTATE RIGHT BASE FOR PERMANENT    This specimen was not marked as sent.    G Prostate Tissue · TISSUE PATHOLOGY EXAM   Evaristo Up MD 11/4/22 1419 No    Description: PROSTATE RIGHT MID FOR PERMANENT    Comment: PROSTATE RIGHT MID FOR PERMANENT    This specimen was not marked as sent.    H Prostate Tissue · TISSUE PATHOLOGY EXAM   Evaristo Up MD 11/4/22 1420 No    Description: PROSTATE RIGHT APEX FOR PERMANENT    Comment: PROSTATE RIGHT APEX FOR PERMANENT    This specimen was not marked as sent.                Drains: * No LDAs found *    Findings:   Target 1= left peripheral zone lesion (biopsy x 2)  Target 2 = right anterior transition zone lesion (biopsy x 2)    Standard 12 core template biopsy performed in addition.     Complications: None          Evaristo Up MD     Date: 11/4/2022  Time: 14:50 EDT

## 2022-11-04 NOTE — DISCHARGE INSTRUCTIONS
Prostate Biopsy Post-Procedure Care/Expectations     Follow these guidelines after your procedure in order to assist with your recovery.    Activity  - Limit yourself to light activity only for 2-3 days after your procedure to prevent rectal and urinary bleeding  - You may return to work in 24 hours     Bleeding  - It is common to notice bleeding in the urine, in the semen, and in the stool after your prostate biopsy   - Urinary bleeding usually stops within a week and is typically light  - Rectal bleeding or blood in the stool can persist for up to 1 week; if you experience very heavy rectal bleeding with large blood clots, or become light headed, present to the nearest emergency department and call your urologist  - Blood in the semen (red discoloration or “michele” discoloration) can persist for up to 6 weeks and this is not inherently harmful to you or your partner     Urination  - You may experience a few days of urinary urgency and frequency after your biopsy which is expected  - Drink plenty of fluid to flush out your bladder and urethra   - If you are unable to urinate for more than 8 hours after your procedure, you may be experiencing urinary retention related to prostatic swelling, and should contact your urologist or present to the nearest emergency department for evaluation and possible urethral catheter placement     Bathing/Showering  - You may resume normal showering and bathing after your procedure     Pain Control  - You will likely have some rectal or pelvic discomfort after your procedure, but this is not typically severe, and very rarely requires the use of narcotics for pain control   - If you experience rectal or pelvic discomfort post-procedure, you should use over the counter Tylenol (Acetaminophen) or Advil/Motrin (Ibuprofen)     Sexual Activity  - Refrain from sexual activity and ejaculation for at least 1 week post-procedure to prevent bleeding and possibly pain    When to call your doctor:      - ANY fever after prostate biopsy is ALWAYS considered significant and should be reported to your urologist right away as this can indicate the possibility of sepsis (bacteria in blood stream) which can be life threatening; the risk of post-prostate biopsy sepsis is less than 4%     - If you experience any of the following symptoms while at home, call your urologist and make plans to present to the nearest emergency department:     - Fever >100 F, shaking chills, nausea or vomiting, profuse sweating   - If you are unable to urinate for more than 8 hours after your biopsy, call your urologist and present to the nearest emergency department for evaluation

## 2022-11-04 NOTE — ANESTHESIA POSTPROCEDURE EVALUATION
Patient: Dakotah Verma    Procedure Summary     Date: 11/04/22 Room / [facility]:  [facility]    Anesthesia Start: 1405 Anesthesia Stop: 1450    Procedure: PROSTATE ULTRASOUND BIOPSY MRI FUSION WITH URONAV (Bilateral) Diagnosis:       Elevated prostate specific antigen (PSA)      (Elevated prostate specific antigen (PSA) [R97.20])    Surgeons: Evaristo Up MD Provider: Rebecca Murdock MD    Anesthesia Type: general ASA Status: 3          Anesthesia Type: general    Vitals  No vitals data found for the desired time range.          Post Anesthesia Care and Evaluation    Patient location during evaluation: PACU  Patient participation: complete - patient participated  Level of consciousness: awake and alert  Pain management: adequate    Airway patency: patent  Anesthetic complications: No anesthetic complications  PONV Status: none  Cardiovascular status: hemodynamically stable and acceptable  Respiratory status: nonlabored ventilation, acceptable and nasal cannula  Hydration status: acceptable

## 2022-11-04 NOTE — ANESTHESIA PREPROCEDURE EVALUATION
Anesthesia Evaluation     Patient summary reviewed and Nursing notes reviewed   NPO Solid Status: > 8 hours  NPO Liquid Status: > 2 hours           Airway   Mallampati: I  Dental          Pulmonary     breath sounds clear to auscultation  Cardiovascular     Rhythm: regular  Rate: normal    (+) hyperlipidemia,       Neuro/Psych  GI/Hepatic/Renal/Endo    (+)  GERD,  diabetes mellitus type 2,     Musculoskeletal     Abdominal    Substance History      OB/GYN          Other        ROS/Med Hx Other: 7/2016    ? Left ventricular ejection fraction is hyperdynamic (Calculated EF > 70%).  ? moderate risk for ischemic heart disease.  ? Findings consistent with a normal ECG stress test.  ? Myocardial perfusion imaging indicates a normal myocardial perfusion study with no evidence of ischemia.        Phys Exam Other: Poor dentition              Anesthesia Plan    ASA 3     general     intravenous induction     Anesthetic plan, risks, benefits, and alternatives have been provided, discussed and informed consent has been obtained with: patient.    Plan discussed with CRNA.        CODE STATUS:

## 2022-11-06 NOTE — OP NOTE
PROSTATE ULTRASOUND BIOPSY MRI FUSION WITH URONAV  Procedure Report    Patient Name:  Dakotah Verma  YOB: 1950    Date of Surgery:  11/4/2022     Indications: 72-year-old male with a history of elevated PSA, completed MRI prostate which demonstrates a PI-RADS 4 and PI-RADS 3 lesion in the prostate, he elects to proceed with targeted MRI/ultrasound fusion prostate biopsy after discussion of risks, benefits, alternatives.    Pre-op Diagnosis:   Elevated prostate specific antigen (PSA) [R97.20]       Post-Op Diagnosis Codes:     * Elevated prostate specific antigen (PSA) [R97.20]    Procedure/CPT® Codes:44003, 17237      Procedure(s):  PROSTATE ULTRASOUND BIOPSY MRI FUSION WITH URONAV    Staff:  Surgeon(s):  Evaristo Up MD         Anesthesia: Choice    Estimated Blood Loss: minimal    Implants:    Nothing was implanted during the procedure    Specimen:     Prostate biopsy specimens              Findings: Target 1 left peripheral zone, targeted to right anterior transitional zone, 12 core biopsy performed in addition (total 16 cores)     Complications: None    Description of Procedure:    The patient was positioned and prepped in a left lateral position with lower extremities flexed.  Patient underwent smooth monitored anesthesia care per anesthesia.      A digital rectal exam was performed identifying a benign feeling prostate. The rectal ultrasound probe was slowly introduced into the rectum without difficulty.  The prostate and seminal vesicles were inspected systematically using axial and sagittal views with the ultrasound.  The dimensions of the prostate were measured, for a calculated volume of 50 mL, PSA Density 0.17.      Next 5 cc of 1% lidocaine was injected at the right and left neurovascular bundles at the junction of the seminal vesicles bilaterally. Next, the Uronav rep linked the ultrasound imaging to the patient's MRI images. The prostate was swept and the Uronav platform  calibrated.  Next using a true cut 14 Fr biopsy needle, 2 prostate cores were collected that target #1 which was the left peripheral zone identified on the Uronav MRI/US fusion platform.  2 prostate cores were then collected at target #2 which was the right anterior transition zone.  I then performed a standard 12 core biopsy, the specific locations were the following: left lateral base, left lateral mid, left lateral apex, left medial base, left medial mid, and left medial apex, right lateral base, right lateral mid, right lateral apex, right medial base, right medial mid, right medial apex, and right and left transition zones. The rectal ultrasound probe was removed.  A CARINA was again performed revealing little blood in the rectum.  The patient tolerated the procedure well.            Disposition/Follow Up:      1.  The patient was instructed to drink plenty of fluids and warned about possible complications and side effects including, but not limited to, blood in the urine, stool and semen as well as bloodstream infection.  He was instructed to call the office if there are any issues, especially fevers or flu-like symptoms.    2.  Continue antibiotic for a total of 3 days.  3.  Will call the patient to discuss pathology results and next steps      Evaristo Up MD     Date: 11/6/2022  Time: 14:15 EST

## 2022-11-08 LAB
CYTO UR: NORMAL
LAB AP CASE REPORT: NORMAL
LAB AP CLINICAL INFORMATION: NORMAL
PATH REPORT.FINAL DX SPEC: NORMAL
PATH REPORT.GROSS SPEC: NORMAL

## 2022-11-10 ENCOUNTER — TELEPHONE (OUTPATIENT)
Dept: UROLOGY | Facility: CLINIC | Age: 72
End: 2022-11-10

## 2022-11-10 NOTE — TELEPHONE ENCOUNTER
I called the patient back with results of his prostate biopsy which demonstrates grade group 2 prostate cancer involving the left apex (2/2 cores >60% cores) as well as a few additional cores of low-grade prostate cancer.  I discussed with the patient that I would recommend definitive treatment options to include radiation or robotic prostatectomy.  The patient was offered a follow-up return visit in my clinic to discuss treatment options in further detail which he would prefer.    Plan  Return to clinic Tuesday, 11/15/2022 2:30 PM for prostate cancer treatment discussion    Evaristo Up MD

## 2022-11-15 ENCOUNTER — OFFICE VISIT (OUTPATIENT)
Dept: UROLOGY | Facility: CLINIC | Age: 72
End: 2022-11-15

## 2022-11-15 VITALS — BODY MASS INDEX: 22.4 KG/M2 | WEIGHT: 160 LBS | HEIGHT: 71 IN

## 2022-11-15 DIAGNOSIS — C61 PROSTATE CANCER: ICD-10-CM

## 2022-11-15 DIAGNOSIS — R30.0 DYSURIA: Primary | ICD-10-CM

## 2022-11-15 PROCEDURE — 99215 OFFICE O/P EST HI 40 MIN: CPT | Performed by: STUDENT IN AN ORGANIZED HEALTH CARE EDUCATION/TRAINING PROGRAM

## 2022-11-15 PROCEDURE — 87086 URINE CULTURE/COLONY COUNT: CPT | Performed by: STUDENT IN AN ORGANIZED HEALTH CARE EDUCATION/TRAINING PROGRAM

## 2022-11-15 RX ORDER — CEFDINIR 300 MG/1
300 CAPSULE ORAL 2 TIMES DAILY
Qty: 8 CAPSULE | Refills: 0 | Status: SHIPPED | OUTPATIENT
Start: 2022-11-15 | End: 2022-12-13

## 2022-11-15 NOTE — PROGRESS NOTES
Follow Up Office Visit      Patient Name: Dakotah Verma  : 1950   MRN: [id number]     Chief Complaint:    Chief Complaint   Patient presents with   • Prostate Cancer Treatment Discussion       Referring Provider: No ref. provider found    History of Present Illness: Dakotah Verma is a 72 y.o. male who presents today for follow up of prostate cancer.  The patient has a history of type 2 diabetes, vitamin D deficiency, originally presented to my clinic in late September with history of elevated PSA.  PSA at that time was 8.7.  Patient completed select MDX urinary prostate cancer screening test, indicating 66% chance of finding any prostate cancer and a 38% chance of finding clinically meaningful [place] 7 or higher prostate cancer.  He completed MRI prostate demonstrating a 49 cc gland, PI-RADS 4 lesion in the left peripheral zone and PI-RADS 3 lesion in the right transitional zone.  He was taken to the operating room 2022 for fusion prostate biopsy.  This demonstrates:    1.  PROSTATE, TARGET #1, NEEDLE CORE BIOPSY:  Prostatic adenocarcinoma, Gene score 3+3=6 (grade group 1)  Tumor involves ~60% of biopsy material (1 out of 1 core)    2.  PROSTATE, TARGET #2, NEEDLE CORE BIOPSY:  Benign prostatic tissue    3.  PROSTATE, LEFT BASE, NEEDLE CORE BIOPSY:  Benign prostatic tissue     4.  PROSTATE, LEFT MID, NEEDLE CORE BIOPSY:  Prostatic adenocarcinoma, [place] score 3+3=6 (grade group 1)  Tumor involves less than 5% of biopsy material (1 out of multiple small tissue fragments)    5.  PROSTATE, LEFT APEX, NEEDLE CORE BIOPSY:  Prostatic adenocarcinoma, [place] score 3+4=7 (grade group 2)  Tumor involves ~60% of biopsy material (2 out of 2 cores)  Perineural invasion present    6.  PROSTATE, RIGHT BASE, NEEDLE CORE BIOPSY:  Benign prostatic tissue    7.  PROSTATE, RIGHT MID, NEEDLE CORE BIOPSY:  Benign prostatic tissue    8.  PROSTATE, RIGHT APEX, NEEDLE CORE BIOPSY:  Extremely minute fragment of  prostatic stroma, no epithelial tissue present      Lab Results   Component Value Date    PSA 8.7 (H) 2022     Patient is noted to have fairly low-volume favorable intermediate risk prostate cancer.  The patient presents today for follow-up and treatment discussion.  He states he has had some persistent urinary bleeding over the last few days and he is concerned about the possibility of UTI.  I will send him cefdinir for treatment and sent a urine culture from clinic today.  He denies fevers or chills at home.  Denies any rectal bleeding at this time.    The patient does not have any previous abdominal surgery, he denies any previous cardiac or pulmonary issues.  He is not on anticoagulation.  He believes his uncle possibly  of prostate cancer.  He has no significant urinary tract symptoms, he is not sexually active.              Subjective      Review of System: Review of Systems   Genitourinary: Positive for dysuria.      I have reviewed the ROS documented by my clinical staff, I have updated appropriately and I agree. Evaristo Up MD    I have reviewed and the following portions of the patient's history were updated as appropriate: past family history, past medical history, past social history, past surgical history and problem list.    Medications:     Current Outpatient Medications:   •  Accu-Chek Leah Plus test strip, TEST ONCE DAILY, Disp: 100 each, Rfl: 3  •  Accu-Chek Softclix Lancets lancets, CHECK BLOOD SUGAR EVERY DAY OR AS DIRECTED FOR DIABETES, Disp: 100 each, Rfl: 2  •  docusate sodium (COLACE) 100 MG capsule, Take 1 capsule by mouth 2 (Two) Times a Day., Disp: , Rfl:   •  lisinopril (PRINIVIL,ZESTRIL) 5 MG tablet, Take 1 tablet by mouth Daily. (Patient taking differently: Take 5 mg by mouth Every Evening.), Disp: 90 tablet, Rfl: 1  •  metFORMIN ER (GLUCOPHAGE-XR) 500 MG 24 hr tablet, Take 2 tablets by oral route in AM and 2 tablets by oral route in PM with meals, Disp: 360 tablet,  Rfl: 1  •  omeprazole (priLOSEC) 20 MG capsule, Take 1 capsule by mouth Daily., Disp: 90 capsule, Rfl: 1  •  vitamin B-12 (CYANOCOBALAMIN) 1000 MCG tablet, Take 1,000 mcg by mouth daily., Disp: , Rfl:   •  cefdinir (OMNICEF) 300 MG capsule, Take 1 capsule by mouth 2 (Two) Times a Day., Disp: 8 capsule, Rfl: 0    Allergies:   Allergies   Allergen Reactions   • Sulfa Antibiotics Hives and Itching       IPSS Questionnaire (AUA-7):  Over the past month…    1)  Incomplete Emptying:       How often have you had a sensation of not emptying you had the sensation of not emptying your bladder completely after you finished urinating?  0 - Not at all   2)  Frequency:       How often have you had the urinate again less than two hours after you finished urinating?  1 - Less than 1 time in 5   3)  Intermittency:       How often have you found you stopped and started again several times when you urinated?   1 - Less than 1 time in 5   4) Urgency:      How often have you found it difficult to postpone urination?  1 - Less than 1 time in 5   5) Weak Stream:      How often have you had a weak urinary stream?  2 - Less than half the time   6) Straining:       How often have you had to push or strain to begin urination?  0 - Not at all   7) Nocturia:      How many times did you most typically get up to urinate from the time you went to bed at night until the time you got up in the morning?  1 - 1 time   Total Score:  6   The International Prostate Symptom Score (IPSS) is used to screen, diagnose, track symptoms of benign prostatic hyperplasia (BPH).   0-7 (Mild Symptoms) 8-19 (Moderate) 20-35 (Severe)   Quality of Life (QoL):  If you were to spend the rest of your life with your urinary condition just the way it is now, how would you feel about that? 2-Mostly Satisfied   Urine Leakage (Incontinence) 0-No Leakage     Sexual Health Inventory for Men (ROHITH)   Over the past 6 months:     1. How do you rate your confidence that you could get  "and keep an erection?  2 - Low   2. When you had erections with sexual  stimulation, how often were your erections hard enough for penetration (entering your partner)?  5 - Almost always or always    3. During sexual intercourse, how often were you able to maintain your erection after you had penetrated (entered) your partner?  1 - Almost never or never   4. During sexual intercourse, how difficult was it to maintain your erection to completion of intercourse?  2 - Very difficult    5. When you attempted sexual intercourse, how often was it satisfactory for you?  3 - Sometimes (About half the time)     Total Score: 13   The Sexual Health Inventory for Men further classifies ED severity with the following breakpoints:   1-7 (Severe ED) 8-11 (Moderate ED) 12-16 (Mild to Moderate ED) 17-21 (Mild ED)           Objective     Physical Exam:   Vital Signs:   Vitals:    11/15/22 1535   Weight: 72.6 kg (160 lb)   Height: 180.3 cm (71\")     Body mass index is 22.32 kg/m².     Physical Exam  Constitutional:       Appearance: Normal appearance.   HENT:      Head: Normocephalic and atraumatic.      Nose: Nose normal.   Eyes:      Extraocular Movements: Extraocular movements intact.      Conjunctiva/sclera: Conjunctivae normal.      Pupils: Pupils are equal, round, and reactive to light.   Musculoskeletal:         General: Normal range of motion.      Cervical back: Normal range of motion and neck supple.   Skin:     General: Skin is warm and dry.      Findings: No lesion or rash.   Neurological:      General: No focal deficit present.      Mental Status: He is alert and oriented to person, place, and time. Mental status is at baseline.   Psychiatric:         Mood and Affect: Mood normal.         Behavior: Behavior normal.         Labs:   Brief Urine Lab Results     None               Lab Results   Component Value Date    GLUCOSE 116 (H) 11/02/2022    CALCIUM 9.9 11/02/2022     11/02/2022    K 4.4 11/02/2022    CO2 25.0 " "11/02/2022     11/02/2022    BUN 12 11/02/2022    CREATININE 0.70 (L) 11/02/2022    EGFRIFNONA 98 07/08/2016    BCR 17.1 11/02/2022    ANIONGAP 13.0 11/02/2022       Lab Results   Component Value Date    WBC 8.96 11/02/2022    HGB 14.8 11/02/2022    HCT 44.3 11/02/2022    MCV 96.7 11/02/2022     11/02/2022       Images:   No Images in the past 120 days found..    Measures:   Tobacco:   Dakotah Verma  reports that he has never smoked. He has never used smokeless tobacco.        Assessment / Plan      Assessment/Plan:   72 y.o. male who presented today for follow up of recently diagnosed favorable intermediate risk prostate cancer, his disease was found at the left prostatic apex and involved 2 of 2 cores at that site including roughly 60% of the biopsy specimen at that site.      discussed the [place] score as well as \"Grade Group Scoring\" in detail with respect to their role in tumor biology and behavior.      I then discussed the treatment options for a man with favorable intermediate risk disease as outlined by the NCCN in whom at least 10 years of life expectancy is anticipated:    1.  Surgery with pelvic lymph node dissection +/- adjuvant therapies  2.  External beam radiotherapy or brachytherapy    Radiation  I had a brief discussion with the patient's about some of the pros and cons to radiation, pros would include lower upfront quality of life changes and reduced initial side effects associated with urinary incontinence and erectile dysfunction, however both of these are possible long-term.  We also discussed possibilities of bladder and rectal irritation with radiation.  I also briefly mentioned the difficulty associated with post radiation prostatectomy if the patient were to develop a recurrence after definitive treatment with radiation.  This is in contrast to upfront surgery with the ability to perform salvage or adjuvant radiation if the patient experiences biochemical recurrence after " definitive therapy with surgery.  Patient expressed understanding.    As I do with all patients I  with newly diagnosed prostate cancer, I encouraged him to seek out further discussion with Radiation-Oncology for a more detailed discussion, and offered him a referral if he would like.     Surgery  I reviewed the role of surgery and the relevant surgical anatomy and the role of the robotic platform.  I explained that surgery for prostate cancer exists on a continuum of quality of life outcomes and cancer control.  We reviewed that a maximal cancer surgery is also associated with maximal impacts on quality of life (erectile dysfunction and incontinence).  I explained that the approach utilized for the surgery is driven by his goals of cancer care and his particular pathology and staging.      I explained that incontinence after robotic or open prostatectomy is typically an inevitability, but usually improves within weeks after surgery, the majority of men are dry just a few months after surgery, but some may struggle with incontinence out to a year, and some men may still be dealing with incontinence after 12 months.  Greater than 90% of men will achieve continence at 1 year.  Failure to achieve continence after 12 months is considered abnormal, and patients are typically offered surgical treatment options to correct this if needed.    I reviewed that the rate of potency is dependent on baseline functional status and time.  Specifically if a bilateral nerve sparing procedure were performed in the setting of perfect erections pre-operatively, that we would expect roughly 85% of men to regain potency within 2 years of surgery; most of them beginning their recovery around 6-9 months from surgery.  Of these 85% of men, roughly 50% will require medications to support their erections long-term and that all men will initially require some degree of medication support.  Of the 15% of men who do not regain function,  "this will require either a vacuum erection device or injection therapy.  The trade-off is that with more preserved tissue there is a greater probability of positive surgical margins.  The presence of a margin would therefore be associated with an increased risk of recurrent disease and need for adjuvant and salvage therapies.       On the alternative end of the surgical spectrum we could proceed with with \"wide\" dissection bilaterally, which would maximize the tissues removed.  This would result in longer time to continence and potency only achievable with injection or vacuum erection devices given removal of the nerves which are necessary for natural erections.  The rate of continence is the same 95% at 1 year, but it takes more time to achieve that result, with most men achieving continence around 6 months from surgery.  While this approach does not guarantee negative margins and a lack of recurrence, the rate of margins is lessened with the greater tissue removed.    We discussed the role of pelvic lymphadenectomy or removal of the pelvic lymph node tissues to assist with staging the patient and ruling out local prostate cancer spread, we also discussed that lymph node removal may offer cancer specific survival benefit in some men as well, by removing a potential source of microscopic cancer cells or potential sites of spread.  I described to the patient that I always remove lymph nodes during prostatectomy, and then I believe it gives us the best and most robust information in terms of his overall cancer staging, which may inform need for further treatment post prostatectomy if advanced disease or locally metastatic disease is found.    I also reviewed the specific procedural risks, which include, but are not limited to infection, bleeding, need for blood transfusion, and injury to surrounding structures including the rectum, small bowel, bladder, ureters, blood vessels, nerves specifically the obturator nerve.  " I reviewed the general procedural risks of wound healing complications, wound infections, conversion to open procedure as well as termination of procedure, or need for additional procedures. We have discussed the risk of long term neuropraxia, air emboli, MI, DVT, PE, stroke, and death.      Survivorship   I then discussed survivorship care which consists of monitoring for recurrence and management of treatment related side effects.    I reviewed how pathology dictates follow-up and risk of recurrence.  I explained that men with treated prostate cancer (surgery or radiation) are at risk of recurrence during follow-up and that historically up around 30% of men will require adjuvant treatments; often based on the post-surgical pathology.  I additionally reviewed how monitoring is performed to maximize the benefit of adjuvant therapies should they be required.      I provided the patient a copy of my prostate cancer packet and encouraged him to review it in detail as it reinforces and expands on the risks and benefits of each approach to managing prostatectomy.    Discussion summary  The patient is leaning towards robotic prostatectomy but he is not sure he is ready to make a decision.  He was provided educational materials and he was offered a referral to radiation oncology to discuss their treatment plan for him if he would like.  I have encouraged the patient to give me a call to discuss at his convenience.  We will set him up for a 4-week follow-up for safety visit in the meantime.        Diagnoses and all orders for this visit:    1. Prostate cancer (HCC)  -Patient will call me with treatment decision    2.  Dysuria (Primary)  -     cefdinir (OMNICEF) 300 MG capsule; Take 1 capsule by mouth 2 (Two) Times a Day.  Dispense: 8 capsule; Refill: 0  -     Urine Culture - Urine, Urine, Clean Catch        Follow Up:   Return in about 4 weeks (around 12/13/2022).    I spent approximately 40 minutes providing clinical care  for this patient; including review of patient's chart and provider documentation, face to face time spent with patient in examination room (obtaining history, performing physical exam, discussing diagnosis and management options), placing orders, and completing patient documentation.     Evaristo Up MD  [facility]

## 2022-11-16 ENCOUNTER — TELEPHONE (OUTPATIENT)
Dept: UROLOGY | Facility: CLINIC | Age: 72
End: 2022-11-16

## 2022-11-16 LAB — BACTERIA SPEC AEROBE CULT: NO GROWTH

## 2022-11-16 NOTE — TELEPHONE ENCOUNTER
----- Message from Evaristo Up MD sent at 11/16/2022  1:04 PM EST -----  Please let patient know his urine culture is negative for growth and he can continue the antibiotics as prescribed based on his symptoms thanks

## 2022-12-13 ENCOUNTER — OFFICE VISIT (OUTPATIENT)
Dept: UROLOGY | Facility: CLINIC | Age: 72
End: 2022-12-13

## 2022-12-13 VITALS — BODY MASS INDEX: 22.4 KG/M2 | HEIGHT: 71 IN | WEIGHT: 160 LBS

## 2022-12-13 DIAGNOSIS — R97.20 ELEVATED PROSTATE SPECIFIC ANTIGEN (PSA): ICD-10-CM

## 2022-12-13 DIAGNOSIS — C61 PROSTATE CANCER: Primary | ICD-10-CM

## 2022-12-13 PROCEDURE — 99213 OFFICE O/P EST LOW 20 MIN: CPT | Performed by: STUDENT IN AN ORGANIZED HEALTH CARE EDUCATION/TRAINING PROGRAM

## 2022-12-13 PROCEDURE — 51798 US URINE CAPACITY MEASURE: CPT | Performed by: STUDENT IN AN ORGANIZED HEALTH CARE EDUCATION/TRAINING PROGRAM

## 2022-12-13 NOTE — PROGRESS NOTES
Follow Up Office Visit      Patient Name: Dakotah Verma  : 1950   MRN: [id number]     Chief Complaint:    Chief Complaint   Patient presents with   • Dysuria   • Prostate Cancer   • Discuss Treatment options       Referring Provider: No ref. provider found    History of Present Illness: Dakotah Verma is a 72 y.o. male who presents today for follow up of prostate cancer.  He has a history of type 2 diabetes, hyperlipidemia, anemia, vitamin D deficiency, originally established care with me in September for history of elevated PSA.  PSA at that time was 8.7.  He had minimal urinary symptoms.  He is not sexually active.  Select MDX urinary prostate cancer screening test indicated 38% chance of finding clinically meaning cancer.  He underwent MRI prostate demonstrating PI-RADS 4 lesion in the left peripheral zone and PI-RADS 3 lesions in the right transition zone.  He completed fusion prostate biopsy 2022    Final Diagnosis   1.  PROSTATE, TARGET #1, NEEDLE CORE BIOPSY:  Prostatic adenocarcinoma, Gene score 3+3=6 (grade group 1)  Tumor involves ~60% of biopsy material (1 out of 1 core)    2.  PROSTATE, TARGET #2, NEEDLE CORE BIOPSY:  Benign prostatic tissue    3.  PROSTATE, LEFT BASE, NEEDLE CORE BIOPSY:  Benign prostatic tissue     4.  PROSTATE, LEFT MID, NEEDLE CORE BIOPSY:  Prostatic adenocarcinoma, Gene score 3+3=6 (grade group 1)  Tumor involves less than 5% of biopsy material (1 out of multiple small tissue fragments)    5.  PROSTATE, LEFT APEX, NEEDLE CORE BIOPSY:  Prostatic adenocarcinoma, Gene score 3+4=7 (grade group 2)  Tumor involves ~60% of biopsy material (2 out of 2 cores)  Perineural invasion present    6.  PROSTATE, RIGHT BASE, NEEDLE CORE BIOPSY:  Benign prostatic tissue    7.  PROSTATE, RIGHT MID, NEEDLE CORE BIOPSY:  Benign prostatic tissue    8.  PROSTATE, RIGHT APEX, NEEDLE CORE BIOPSY:  Extremely minute fragment of prostatic stroma, no epithelial tissue present      Patient is found to have small amount of low-grade disease as well as 2 out of 2 cores positive for grade group 2 disease at the left apex involving greater than 60% of cores.    Patient saw me last in clinic 11/15/2022 to discuss treatment options.  At that time he was not ready to make a treatment decision.  He was offered referral to radiation oncology.  I have not heard from the patient over the last month.  He presents for safety visit today.  He states he would like to proceed with radiation oncology referral    At last visit, patient was prescribed cefdinir 11/15/2022 for some smoldering prostatitis type symptoms after his prostate biopsy.  He states it took a number of weeks to recover from his biopsy but has no significant urgency, frequency, dysuria or fevers or chills at home.  He is otherwise doing well.    Subjective      Review of System: Review of Systems   Genitourinary: Negative for decreased urine volume, difficulty urinating, dysuria, enuresis, flank pain, frequency, hematuria and urgency.      I have reviewed the ROS documented by my clinical staff, I have updated appropriately and I agree. Evaristo Up MD    I have reviewed and the following portions of the patient's history were updated as appropriate: past family history, past medical history, past social history, past surgical history and problem list.    Medications:     Current Outpatient Medications:   •  Accu-Chek Leah Plus test strip, TEST ONCE DAILY, Disp: 100 each, Rfl: 3  •  Accu-Chek Softclix Lancets lancets, CHECK BLOOD SUGAR EVERY DAY OR AS DIRECTED FOR DIABETES, Disp: 100 each, Rfl: 2  •  docusate sodium (COLACE) 100 MG capsule, Take 1 capsule by mouth 2 (Two) Times a Day., Disp: , Rfl:   •  lisinopril (PRINIVIL,ZESTRIL) 5 MG tablet, Take 1 tablet by mouth Daily. (Patient taking differently: Take 5 mg by mouth Every Evening.), Disp: 90 tablet, Rfl: 1  •  metFORMIN ER (GLUCOPHAGE-XR) 500 MG 24 hr tablet, Take 2 tablets  by oral route in AM and 2 tablets by oral route in PM with meals, Disp: 360 tablet, Rfl: 1  •  omeprazole (priLOSEC) 20 MG capsule, Take 1 capsule by mouth Daily., Disp: 90 capsule, Rfl: 1  •  vitamin B-12 (CYANOCOBALAMIN) 1000 MCG tablet, Take 1,000 mcg by mouth daily., Disp: , Rfl:   •  cefdinir (OMNICEF) 300 MG capsule, Take 1 capsule by mouth 2 (Two) Times a Day., Disp: 8 capsule, Rfl: 0    Allergies:   Allergies   Allergen Reactions   • Sulfa Antibiotics Hives and Itching       IPSS Questionnaire (AUA-7):  Over the past month…    1)  Incomplete Emptying:       How often have you had a sensation of not emptying you had the sensation of not emptying your bladder completely after you finished urinating?  0 - Not at all   2)  Frequency:       How often have you had the urinate again less than two hours after you finished urinating?  1 - Less than 1 time in 5   3)  Intermittency:       How often have you found you stopped and started again several times when you urinated?   0 - Not at all   4) Urgency:      How often have you found it difficult to postpone urination?  1 - Less than 1 time in 5   5) Weak Stream:      How often have you had a weak urinary stream?  0 - Not at all   6) Straining:       How often have you had to push or strain to begin urination?  0 - Not at all   7) Nocturia:      How many times did you most typically get up to urinate from the time you went to bed at night until the time you got up in the morning?  1 - 1 time   Total Score:  3   The International Prostate Symptom Score (IPSS) is used to screen, diagnose, track symptoms of benign prostatic hyperplasia (BPH).   0-7 (Mild Symptoms) 8-19 (Moderate) 20-35 (Severe)   Quality of Life (QoL):  If you were to spend the rest of your life with your urinary condition just the way it is now, how would you feel about that? 1-Pleased   Urine Leakage (Incontinence) 0-No Leakage     Sexual Health Inventory for Men (ROHITH)   Over the past 6 months:     1.  "How do you rate your confidence that you could get and keep an erection?  3 - Moderate   2. When you had erections with sexual  stimulation, how often were your erections hard enough for penetration (entering your partner)?  5 - Almost always or always    3. During sexual intercourse, how often were you able to maintain your erection after you had penetrated (entered) your partner?  0 - Did not attempt intercourse   4. During sexual intercourse, how difficult was it to maintain your erection to completion of intercourse?  0 - Did not attempt intercourse   5. When you attempted sexual intercourse, how often was it satisfactory for you?  0 - Did not attempt intercourse    Total Score: 5   The Sexual Health Inventory for Men further classifies ED severity with the following breakpoints:   1-7 (Severe ED) 8-11 (Moderate ED) 12-16 (Mild to Moderate ED) 17-21 (Mild ED)           Objective     Physical Exam:   Vital Signs:   Vitals:    12/13/22 1036   Weight: 72.6 kg (160 lb)   Height: 180.3 cm (71\")     Body mass index is 22.32 kg/m².     Physical Exam  Constitutional:       Appearance: Normal appearance.   HENT:      Head: Normocephalic and atraumatic.      Nose: Nose normal.   Eyes:      Extraocular Movements: Extraocular movements intact.      Conjunctiva/sclera: Conjunctivae normal.      Pupils: Pupils are equal, round, and reactive to light.   Musculoskeletal:         General: Normal range of motion.      Cervical back: Normal range of motion and neck supple.   Skin:     General: Skin is warm and dry.      Findings: No lesion or rash.   Neurological:      General: No focal deficit present.      Mental Status: He is alert and oriented to person, place, and time. Mental status is at baseline.   Psychiatric:         Mood and Affect: Mood normal.         Behavior: Behavior normal.         Labs:   Brief Urine Lab Results     None          Urine Culture    Urine Culture 11/15/22   Urine Culture No growth              Lab " Results   Component Value Date    GLUCOSE 116 (H) 11/02/2022    CALCIUM 9.9 11/02/2022     11/02/2022    K 4.4 11/02/2022    CO2 25.0 11/02/2022     11/02/2022    BUN 12 11/02/2022    CREATININE 0.70 (L) 11/02/2022    EGFRIFNONA 98 07/08/2016    BCR 17.1 11/02/2022    ANIONGAP 13.0 11/02/2022       Lab Results   Component Value Date    WBC 8.96 11/02/2022    HGB 14.8 11/02/2022    HCT 44.3 11/02/2022    MCV 96.7 11/02/2022     11/02/2022       Images:   No Images in the past 120 days found..    Measures:   Tobacco:   Dakotah Verma  reports that he has never smoked. He has never used smokeless tobacco.      Assessment / Plan      Assessment/Plan:   72 y.o. male who presented today for follow up of favorable intermediate risk, grade group 2 prostate cancer involving the left apex.  We have had a thorough discussion regarding treatment options.  Please see previous note for detailed discussion.  Patient elects to proceed with radiation oncology referral, he is interested in radiation.  We discussed the eventual need for fiducial marker placement.  Patient can be referred back to me for fiducials once he has met with radiation oncology team.     Diagnoses and all orders for this visit:    1. Prostate cancer (HCC) (Primary)  -     Ambulatory Referral to Radiation Oncology-[place]         Follow Up:   No follow-ups on file.    I spent approximately 20 minutes providing clinical care for this patient; including review of patient's chart and provider documentation, face to face time spent with patient in examination room (obtaining history, performing physical exam, discussing diagnosis and management options), placing orders, and completing patient documentation.     Evaristo Up MD  [facility] [place]

## 2023-01-04 ENCOUNTER — HOSPITAL ENCOUNTER (OUTPATIENT)
Dept: RADIATION ONCOLOGY | Facility: HOSPITAL | Age: 73
Setting detail: RADIATION/ONCOLOGY SERIES
Discharge: HOME | End: 2023-01-04
Payer: MEDICARE

## 2023-01-04 ENCOUNTER — OFFICE VISIT (OUTPATIENT)
Dept: RADIATION ONCOLOGY | Facility: HOSPITAL | Age: 73
End: 2023-01-04
Payer: MEDICARE

## 2023-01-04 VITALS
HEART RATE: 86 BPM | RESPIRATION RATE: 18 BRPM | TEMPERATURE: 97.8 F | SYSTOLIC BLOOD PRESSURE: 161 MMHG | DIASTOLIC BLOOD PRESSURE: 82 MMHG | OXYGEN SATURATION: 98 % | WEIGHT: 167.7 LBS | BODY MASS INDEX: 23.48 KG/M2 | HEIGHT: 71 IN

## 2023-01-04 DIAGNOSIS — Z12.11 ENCOUNTER FOR SCREENING COLONOSCOPY: ICD-10-CM

## 2023-01-04 DIAGNOSIS — K75.81 NASH (NONALCOHOLIC STEATOHEPATITIS): Primary | ICD-10-CM

## 2023-01-04 DIAGNOSIS — C61 PROSTATE CANCER: ICD-10-CM

## 2023-01-04 PROCEDURE — G0463 HOSPITAL OUTPT CLINIC VISIT: HCPCS | Performed by: RADIOLOGY

## 2023-01-04 NOTE — PROGRESS NOTES
CONSULTATION NOTE      :                                                          1950  DATE OF CONSULTATION:                       2023   REQUESTING PHYSICIAN:                   Evaristo Up MD  REASON FOR CONSULTATION:           Prostate Cancer   Cancer Staging   Stage IIB (cT1c, cN0, cM0, PSA: 8.7, Grade Group: 2)    Thank you for requesting my services in evaluation of this pleasant individual.  I am seeing them in outpatient consultation regarding a diagnosis of prostate cancer.     BRIEF HISTORY:  The patient is a very pleasant 72 y.o. male  with a past medical history significant for type 2 diabetes, nonalcoholic steatohepatitis, and GERD, who was found to have a rising PSA that jumped from 5.6 to 8.7 NG/mL.  As a result, he was referred to urology and evaluated by Dr. Up.  A select MDX urinary prostate cancer screening test indicated a 30% chance of finding a clinically meaningful prostate cancer, he then underwent a prostate MRI revealing a PI-RADS 4 lesion in the left peripheral zone and a PI-RADS 3 lesion in the right transition zone, he then underwent a fusion prostate biopsy on 2022.  Final pathology demonstrated a Gene 3+3 = 6 adenocarcinoma in 2 cores from the left mid prostate and the target lesion in the left peripheral zone.  He was also found to have a Gene 3+4 = 7 adenocarcinoma in 2 cores from the left apex.  All remaining cores were negative.  He has been recommended to undergo treatment and he is being referred to my clinic now for consideration of definitive radiation.  From a symptomatic standpoint, his IPSS score today is 2 with symptoms of frequency less than 20% of the time and nocturia x1.  He denies any gastrointestinal symptoms and he reports low level of erectile function.  His last colonoscopy was performed in 2016 which demonstrated mild diverticulosis and he was recommended to undergo repeat screening exam in 5 years taking into  account the diagnosis of EGE.  He has not yet completed that colonoscopy, which would have been due in the summer 2021.    Allergy:   Allergies   Allergen Reactions   • Sulfa Antibiotics Hives and Itching       Social History:   Social History     Socioeconomic History   • Marital status:    Tobacco Use   • Smoking status: Never   • Smokeless tobacco: Never   Vaping Use   • Vaping Use: Never used   Substance and Sexual Activity   • Alcohol use: No   • Drug use: No   • Sexual activity: Not Currently       Past Medical History:   Past Medical History:   Diagnosis Date   • DM (diabetes mellitus) (HCC)    • Fatty liver    • GERD with esophagitis    • Nonalcoholic fatty liver disease    • Prostate cancer (HCC)    • Prostatitis    • Sciatica, left side    • Speech abnormality     Pt has stuttered as a child, sometimes  has some trouble getting words out.   • Tonsillitis    • Vitamin D deficiency        Family History: family history includes Asthma in his sister; COPD in his father; Diabetes type II in his mother and sister; Hypertension in his mother; Mental illness in his mother; Other in his sister; Stroke in his mother.     Surgical History:   Past Surgical History:   Procedure Laterality Date   • ADENOIDECTOMY     • COLONOSCOPY      2016   • LUMBAR DISCECTOMY  10/30/2014   • PROSTATE BIOPSY Bilateral 11/04/2022    Procedure: PROSTATE ULTRASOUND BIOPSY MRI FUSION WITH URONAV;  Surgeon: Evaristo Up MD;  Location: [facility];  Service: Urology;  Laterality: Bilateral;   • TONSILLECTOMY          Review of Systems:   Review of Systems   HENT:   Positive for tinnitus.    All other systems reviewed and are negative.       IPSS Questionnaire (AUA-7):  Over the past month…    1)  Incomplete Emptying  How often have you had a sensation of not emptying your bladder?  0 - Not at all   2)  Frequency  How often have you had to urinate less than every two hours? 1 - Less than 1 time in 5   3)  Intermittency  How  often have you found you stopped and started again several times when you urinated?  0 - Not at all   4) Urgency  How often have you found it difficult to postpone urination?  0 - Not at all   5) Weak Stream  How often have you had a weak urinary stream?  0 - Not at all   6) Straining  How often have you had to push or strain to begin urination?  0 - Not at all   7) Nocturia  How many times did you typically get up at night to urinate?  1 - 1 time   Total Score:  2       Quality of life due to urinary symptoms:  If you were to spend the rest of your life with your urinary condition the way it is now, how would you feel about that? 1-Pleased   Urine Leakage (Incontinence) 0-No Leakage     Sexual Health Inventory  Current Status    1)  How do you rate your confidence that you could achieve and keep an erection? 2-Low   2) When you had erections with sexual stimulation, how often were your erections hard enough for penetration (entering your partner)? 0-No sexual activity   3)  During sexual intercourse, how often were you able to maintain your erection after you had penetrated (entered) into your partner? 0-Did not attempt intercourse   4) During sexual intercourse, how difficult was it to maintain your erection to completion of intercourse? 0-Did not attempt intercourse   5) When you attempted sexual intercourse, how often was it satisfactory to you? 0-No sexual activity   Total Score: 2       Bowel Health Inventory  Current Status: 0-No problems, no rectal bleeding, no discharge, less then 5 bowel movements a day       Objective   VITAL SIGNS:   Vitals:    01/04/23 1327   BP: 161/82   Pulse: 86   Resp: 18   Temp: 97.8 °F (36.6 °C)   TempSrc: Temporal   SpO2: 98%   Weight: 76.1 kg (167 lb 11.2 oz)   Height: 180.3 cm (71\")   PainSc: 0-No pain        Karnofsky score: 90       Physical Exam:   Physical Exam  Vitals and nursing note reviewed.   Constitutional:       General: He is not in acute distress.     Appearance:  He is well-developed.   HENT:      Head: Normocephalic and atraumatic.   Eyes:      Conjunctiva/sclera: Conjunctivae normal.      Pupils: Pupils are equal, round, and reactive to light.   Cardiovascular:      Rate and Rhythm: Normal rate and regular rhythm.      Heart sounds: No murmur heard.    No friction rub.   Pulmonary:      Effort: Pulmonary effort is normal.      Breath sounds: Normal breath sounds. No wheezing.   Abdominal:      General: Bowel sounds are normal. There is no distension.      Palpations: Abdomen is soft. There is no mass.      Tenderness: There is no abdominal tenderness.   Genitourinary:     Comments: 50gm prostate without nodules  Musculoskeletal:         General: Normal range of motion.      Cervical back: Normal range of motion and neck supple.   Lymphadenopathy:      Cervical: No cervical adenopathy.   Skin:     General: Skin is warm and dry.   Neurological:      Mental Status: He is alert and oriented to person, place, and time.   Psychiatric:         Behavior: Behavior normal.         Thought Content: Thought content normal.         Judgment: Judgment normal.     IMAGING  I have personally reviewed the relevant imaging studies, as follows:  MRI Prostate 10/5/2022:  Prostate measures 5.2 x 4.2 cm, corresponding to the volume of 49 cc.  There is a decreased T2 signal in the left posterior lateral peripheral zone measuring 11 x 5 mm, restricted diffusion and abnormal contrast kinetics consistent with PI-RADS 4 lesion.  There is a 11 x 9 mm area of decreased T2 signal in the right lateral transition zone showing significant restricted diffusion and abnormal contrast kinetics consistent with a PI-RADS 3 lesion.  There is otherwise no evidence of abnormalities or adenopathy.    PATHOLOGY  Transrectal Ultrasound Guided Biopsy 11/4/2022:  Left peripheral zone (target #1): Gene 3+3 = 6 adenocarcinoma involving approximately 60% of a single core  Right transition zone (target #2): Benign  prostate tissue  Left base: Benign prostate tissue  Left mid: [place] 3+ = 6 adenocarcinoma involving 5% of biopsy tissue a single core of multiple small fragments  Left apex: [place] 3+4 = 7 adenocarcinoma involving approximately 60% of 2 cores with perineural invasion present  Right base: Benign  Right mid: Benign  Right apex: Extremely minute fragment of prostatic stroma, no epithelial tissue present    LABORATORY  PSA 8/18/2022: 8.7 NG/mL     The following portions of the patient's history were reviewed and updated as appropriate: allergies, current medications, past family history, past medical history, past social history, past surgical history and problem list.    Assessment:   Assessment  Mr. Verma is a 72 year old gentleman with a new diagnosis of a favorable intermediate risk prostate cancer, specifically with a clinical T1c, low volume [place] 3+4 = 7 prostate adenocarcinoma with a pretreatment PSA of 8.7 NG/mL.   I met with the patient today, discussing the different treatment options for an intermediate risk prostate cancer.  Specifically, we discussed radical prostatectomy, interstitial brachytherapy, a standard course of fractionated radiation therapy, and stereotactic body radiation therapy using the CyberKnife treatment unit, with a discussion regarding the logistics, and short term and long term risks of each modality.  He was most interested today in treatment using stereotactic body radiation therapy, and after a full explanation of the risks and benefits, he signed informed consent.  He will need to have fiducials placed, so I will coordinate for him to be seen again by his Urologist for this.  In general, we need 4-5 fiducial markers in order for the Cyberknife to accurately track the prostate during treatment.  Once his fiducials have been placed, I will coordinate for him to return to my clinic for re-evaluation.  On that day, he will complete an MRI of the Prostate, and a Cyberknife planning  session.  I anticipate treating his prostate to 35Gy in 5 fractions of 7Gy each.  I also discussed the necessary bowel prep, low fiber and gas diet, and using alpha blockers during treatment.    Despite his medical comorbidities, he has a calculated healthy life expectancy that exceeds 10 years, therefore I do believe treatment is warranted.  In addition, he was due to have a repeat colonoscopy 1-1/2 years ago as recommended by his gastroenterologist, and I do think it would be prudent to have this completed prior to beginning treatment, therefore we will coordinate for him to be reevaluated by his gastroenterologist in [place], [place] for consideration of a repeat screening colonoscopy prior to beginning radiation treatments.      RECOMMENDATIONS:    1. Plan for Cyberknife Radiosurgery, 35 Gy in 5 fractions  2. Will refer back to Dr. Up for placement of gold seed fiducials  3. Refer to Psychiatric, Gastroenterology for repeat screening colonoscopy    I spent a total of 60 minutes on todays visit, with more than 45 minutes in direct face to face communication, and the remainder of the time spent in reviewing the relevant history, records, available imaging, and for documentation.    Follow Up:   Return in about 3 weeks (around 1/25/2023) for Office Visit, Radiation Simulation, Imaging - See orders.  Diagnoses and all orders for this visit:    1. Prostate cancer (HCC)    Thank you for allowing me to participate in the care of this individual.    Sincerely,       Ezio Montilla MD

## 2023-01-05 DIAGNOSIS — C61 PROSTATE CANCER: Primary | ICD-10-CM

## 2023-01-05 RX ORDER — LEVOFLOXACIN 500 MG/1
500 TABLET, FILM COATED ORAL DAILY
Qty: 3 TABLET | Refills: 0 | Status: SHIPPED | OUTPATIENT
Start: 2023-01-05 | End: 2023-01-08

## 2023-01-10 ENCOUNTER — TELEPHONE (OUTPATIENT)
Dept: UROLOGY | Facility: CLINIC | Age: 73
End: 2023-01-10
Payer: MEDICARE

## 2023-01-10 DIAGNOSIS — C61 PROSTATE CANCER: Primary | ICD-10-CM

## 2023-01-10 NOTE — TELEPHONE ENCOUNTER
----- Message from Anna Xie sent at 1/10/2023  9:33 AM EST -----  This patient is scheduled on 2/7 for gold marker placement. Dr. Up has sent in [facility] for the patient to start 1 day prior to his procedure. Can you please give him a call to discuss prep for procedure? Thank you!     ----- Message -----  From: Evaristo Up MD  Sent: 1/9/2023   5:07 PM EST  To: Anna Xie, #    Ok let's please back up his fiducial markers to 1/31 Tuesday or 2/7 Tuesday after his colonscopy. Thank you. Please contact patient, let him know I want to wait until after colonscopy to do his fiducials.     Evaristo Up MD    ----- Message -----  From: Anna Xie  Sent: 1/9/2023   2:16 PM EST  To: Evaristo Up MD    Fyi - patient is scheduled for his colonoscopy on 1/27.     ----- Message -----  From: Evaristo Up MD  Sent: 1/5/2023  10:26 AM EST  To: Anna Xie    Let's do 1/17 or 1/24 depending on Timeline of colonscopy. I'll need to send in additional course of Bactrim that he can start one day prior to his appt, please remind him, I'll send in now to his pharmacy.     Evaristo Up MD    ----- Message -----  From: Anna Xie  Sent: 1/5/2023   8:57 AM EST  To: Evaristo Up MD    Was going to offer this patient 1/17. Is that okay with you? If they need to come in sooner or later, let me know. I will give them a call to schedule once I get the ok from you. Thanks!     ----- Message -----  From: Zo Botello RN  Sent: 1/4/2023   3:39 PM EST  To: Anna Montilla saw this pt.for consult today. Just need to get him back in with Dr. Up for fiducial markers. No rush on this one- he will be getting a colonoscopy prior to starting treatment. A couple weeks from now should be OK. Thanks- Ruthie

## 2023-01-10 NOTE — TELEPHONE ENCOUNTER
Patient had questions on the procedure he will have on 02/07/23.  Explained the procedure to pt and informed pt to take the Levaquin a day before the procedure.  Patient voiced understanding.

## 2023-01-11 ENCOUNTER — TELEPHONE (OUTPATIENT)
Dept: RADIATION ONCOLOGY | Facility: HOSPITAL | Age: 73
End: 2023-01-11
Payer: MEDICARE

## 2023-01-11 DIAGNOSIS — C61 PROSTATE CANCER: Primary | ICD-10-CM

## 2023-01-11 NOTE — TELEPHONE ENCOUNTER
Pt notified of the following appts:  1/27/23 @ 0830 colonoscopy ( pt previously aware/martha-Dr. Cage)  2/7/23-Markers with Dr. Up ( office to contact with instructions/directions)  2/20/23 @ 10:00 clinic  @11:00 ct sim  @ 12:00 MRI  Educated on the importance of following the ck diet with gas- x 4 times per day starting 2/18/23  Instructed to be NPO for 6 hours prior to MRI and to perform an enema the morning of 2/20/23  Verbalized understanding    Referral to Mari Mabry RD

## 2023-01-16 ENCOUNTER — TELEPHONE (OUTPATIENT)
Dept: FAMILY MEDICINE CLINIC | Facility: CLINIC | Age: 73
End: 2023-01-16
Payer: MEDICARE

## 2023-01-16 NOTE — TELEPHONE ENCOUNTER
----- Message from Kelle Jiménez MA sent at 2023 10:48 AM EST -----  Regarding: FW: [employer] Medicare drug formulary list.  Contact: [phone]        ----- Message -----  From: Dakotah Verma  Sent: 2023  10:40 AM EST  To: [facility]  Subject: [employer] Medicare drug formulary list.             JOHN Chakraborty letter from [employer] tells me that Metformin HCL  MG Tablet is no longer on their drug formulary list or is subject to certain limits and asked me to talk with you about it. So what needs to be done here?  Thanks,  Dakotah Verma    1950.

## 2023-01-16 NOTE — TELEPHONE ENCOUNTER
Called pharmacy they said it is fine pt was only given 2 month supply either because pharmacy was low or they was trying to help pt with his deductible being so high notified pt.

## 2023-01-16 NOTE — TELEPHONE ENCOUNTER
Please call the pharmacy and see if they can help give some guidance for this-- may be not wanting to cover the 2 pills twice a day- thanks

## 2023-02-03 ENCOUNTER — OFFICE VISIT (OUTPATIENT)
Dept: FAMILY MEDICINE CLINIC | Facility: CLINIC | Age: 73
End: 2023-02-03
Payer: MEDICARE

## 2023-02-03 VITALS
BODY MASS INDEX: 23.38 KG/M2 | SYSTOLIC BLOOD PRESSURE: 120 MMHG | OXYGEN SATURATION: 99 % | WEIGHT: 167 LBS | DIASTOLIC BLOOD PRESSURE: 74 MMHG | HEIGHT: 71 IN | HEART RATE: 72 BPM

## 2023-02-03 DIAGNOSIS — E11.9 TYPE 2 DIABETES MELLITUS WITHOUT COMPLICATION, WITHOUT LONG-TERM CURRENT USE OF INSULIN: ICD-10-CM

## 2023-02-03 DIAGNOSIS — C61 PROSTATE CANCER: ICD-10-CM

## 2023-02-03 DIAGNOSIS — K21.00 GASTROESOPHAGEAL REFLUX DISEASE WITH ESOPHAGITIS WITHOUT HEMORRHAGE: Primary | ICD-10-CM

## 2023-02-03 DIAGNOSIS — K76.0 FATTY LIVER: ICD-10-CM

## 2023-02-03 PROCEDURE — 99214 OFFICE O/P EST MOD 30 MIN: CPT | Performed by: FAMILY MEDICINE

## 2023-02-03 RX ORDER — OMEPRAZOLE 20 MG/1
20 CAPSULE, DELAYED RELEASE ORAL DAILY
Qty: 90 CAPSULE | Refills: 3 | Status: SHIPPED | OUTPATIENT
Start: 2023-02-03

## 2023-02-03 RX ORDER — METFORMIN HYDROCHLORIDE 500 MG/1
TABLET, EXTENDED RELEASE ORAL
Qty: 180 TABLET | Refills: 1 | Status: SHIPPED | OUTPATIENT
Start: 2023-02-03

## 2023-02-03 RX ORDER — LISINOPRIL 5 MG/1
5 TABLET ORAL DAILY
Qty: 90 TABLET | Refills: 3 | Status: SHIPPED | OUTPATIENT
Start: 2023-02-03

## 2023-02-03 NOTE — PROGRESS NOTES
Office Note     Name: Dakotah Verma    : 1950     MRN: [id number]     Chief Complaint  Hypertension and Diabetes    Subjective     History of Present Illness:  Dakotah Verma is a 72 y.o. male who presents today for going over his diabetes and fatty liver.  He has recently decided to do a radiation oncology x5 treatments, [employer].  Dhara Rivera has told him he has a fatty liver.  Weight loss of over 35 pounds pretty much sugar got the A1c 5.8%.  Pretty much his hypertension been stable and I want to encourage him to walk 5 minutes on the day, up at 7 minutes a day, etc.    Review of Systems:   Review of Systems    Past Medical History:   Past Medical History:   Diagnosis Date   • DM (diabetes mellitus) (HCC)    • Fatty liver    • GERD with esophagitis    • Nonalcoholic fatty liver disease    • Prostate cancer (HCC)    • Prostatitis    • Sciatica, left side    • Speech abnormality     Pt has stuttered as a child, sometimes  has some trouble getting words out.   • Tonsillitis    • Vitamin D deficiency        Past Surgical History:   Past Surgical History:   Procedure Laterality Date   • ADENOIDECTOMY     • COLONOSCOPY         • LUMBAR DISCECTOMY  10/30/2014   • PROSTATE BIOPSY Bilateral 2022    Procedure: PROSTATE ULTRASOUND BIOPSY MRI FUSION WITH URONAV;  Surgeon: Evaristo Up MD;  Location: [facility];  Service: Urology;  Laterality: Bilateral;   • TONSILLECTOMY         Family History:   Family History   Problem Relation Age of Onset   • Diabetes type II Mother    • Hypertension Mother         PULMONARY   • Stroke Mother    • Mental illness Mother    • COPD Father    • Diabetes type II Sister    • Asthma Sister    • Other Sister         SPINAL BIFIDA       Social History:   Social History     Socioeconomic History   • Marital status:    Tobacco Use   • Smoking status: Never   • Smokeless tobacco: Never   Vaping Use   • Vaping Use: Never used   Substance and Sexual Activity  "  • Alcohol use: No   • Drug use: No   • Sexual activity: Not Currently       Immunizations:   Immunization History   Administered Date(s) Administered   • COVID-19 (MODERNA) 1st, 2nd, 3rd Dose Only 03/23/2021, 04/20/2021, 11/19/2021   • Fluzone High-Dose 65+yrs 08/21/2020, 10/05/2021, 10/23/2022   • Fluzone Quad >6mos (Multi-dose) 08/16/2019   • Hepatitis A 08/16/2019, 02/11/2020   • Hepatitis B 04/01/1997, 05/01/1997   • Hepb Hepatitis B Vaccine Heplisav-b 07/19/2022, 08/19/2022   • Influenza, Unspecified 08/21/2020   • Pneumococcal Conjugate 13-Valent (PCV13) 06/06/2017   • Pneumococcal, Unspecified 03/28/2013   • Shingrix 08/21/2020, 11/20/2020   • Tdap 08/16/2019   • Zoster, Unspecified 04/16/2017, 08/21/2020, 11/20/2020        Medications:     Current Outpatient Medications:   •  Accu-Chek Leah Plus test strip, TEST ONCE DAILY, Disp: 100 each, Rfl: 3  •  Accu-Chek Softclix Lancets lancets, CHECK BLOOD SUGAR EVERY DAY OR AS DIRECTED FOR DIABETES, Disp: 100 each, Rfl: 2  •  docusate sodium (COLACE) 100 MG capsule, Take 1 capsule by mouth 2 (Two) Times a Day., Disp: , Rfl:   •  lisinopril (PRINIVIL,ZESTRIL) 5 MG tablet, Take 1 tablet by mouth Daily., Disp: 90 tablet, Rfl: 3  •  metFORMIN ER (GLUCOPHAGE-XR) 500 MG 24 hr tablet, Take 1 tablet twice a day with meals, Disp: 180 tablet, Rfl: 1  •  omeprazole (priLOSEC) 20 MG capsule, Take 1 capsule by mouth Daily., Disp: 90 capsule, Rfl: 3  •  vitamin B-12 (CYANOCOBALAMIN) 1000 MCG tablet, Take 1,000 mcg by mouth daily., Disp: , Rfl:     Allergies:   Allergies   Allergen Reactions   • Sulfa Antibiotics Hives and Itching       Objective     Vital Signs  /74   Pulse 72   Ht 180.3 cm (71\")   Wt 75.8 kg (167 lb)   SpO2 99%   BMI 23.29 kg/m²   Estimated body mass index is 23.29 kg/m² as calculated from the following:    Height as of this encounter: 180.3 cm (71\").    Weight as of this encounter: 75.8 kg (167 lb).    BMI is within normal parameters. No other " follow-up for BMI required.      Physical Exam  Vitals and nursing note reviewed.   Constitutional:       Appearance: Normal appearance. He is normal weight.   HENT:      Head: Normocephalic.      Right Ear: External ear normal.      Left Ear: External ear normal.      Nose: Nose normal.      Mouth/Throat:      Mouth: Mucous membranes are moist.   Neck:      Vascular: No carotid bruit.   Cardiovascular:      Rate and Rhythm: Normal rate and regular rhythm.      Pulses: Normal pulses.      Heart sounds: Normal heart sounds.   Pulmonary:      Effort: Pulmonary effort is normal.      Breath sounds: Normal breath sounds.   Musculoskeletal:      Cervical back: Normal range of motion and neck supple.   Skin:     General: Skin is warm and dry.   Neurological:      Mental Status: He is alert.   Psychiatric:         Mood and Affect: Mood normal.          Procedures     Assessment and Plan     1. Gastroesophageal reflux disease with esophagitis without hemorrhage  Controlled    2. Type 2 diabetes mellitus without complication, without long-term current use of insulin (HCC)  Controlled.  We decreased the amount of 500 mg 2 tablets twice daily to 1 tablet twice daily    3. Prostate cancer (HCC)  Treatment pending    4. Fatty liver  35 pound weight loss and aerobic exercise stage III cirrhosis       Follow Up  Return in about 6 months (around 8/3/2023).    David BLACKMON [facility]  [address] [address] [phone]  Answers for HPI/ROS submitted by the patient on 1/28/2023  What is the primary reason for your visit?: Other  Please describe your symptoms.: Ashlee says the Metformin ER that i take is no longer on their drug formulary., need to discuss with Dr. Dorsey... reason for visit.  Have you had these symptoms before?: No  How long have you been having these symptoms?: 1-4 days

## 2023-02-07 ENCOUNTER — PROCEDURE VISIT (OUTPATIENT)
Dept: UROLOGY | Facility: CLINIC | Age: 73
End: 2023-02-07
Payer: MEDICARE

## 2023-02-07 VITALS
OXYGEN SATURATION: 96 % | SYSTOLIC BLOOD PRESSURE: 158 MMHG | HEART RATE: 78 BPM | DIASTOLIC BLOOD PRESSURE: 76 MMHG | WEIGHT: 167 LBS | BODY MASS INDEX: 23.38 KG/M2 | HEIGHT: 71 IN

## 2023-02-07 DIAGNOSIS — C61 PROSTATE CANCER: Primary | ICD-10-CM

## 2023-02-07 DIAGNOSIS — R97.20 ELEVATED PROSTATE SPECIFIC ANTIGEN (PSA): ICD-10-CM

## 2023-02-07 PROCEDURE — 55876 PLACE RT DEVICE/MARKER PROS: CPT | Performed by: STUDENT IN AN ORGANIZED HEALTH CARE EDUCATION/TRAINING PROGRAM

## 2023-02-07 PROCEDURE — 76942 ECHO GUIDE FOR BIOPSY: CPT | Performed by: STUDENT IN AN ORGANIZED HEALTH CARE EDUCATION/TRAINING PROGRAM

## 2023-02-07 PROCEDURE — A4648 IMPLANTABLE TISSUE MARKER: HCPCS | Performed by: STUDENT IN AN ORGANIZED HEALTH CARE EDUCATION/TRAINING PROGRAM

## 2023-02-07 NOTE — PROGRESS NOTES
Preprocedure diagnosis  Prostate Cancer     Postprocedure diagnosis  Prostate Cancer     Procedure  1. Transrectal Ultrasound Guided Placement of Gold Fiducial Markers  2. Total Number of Markers Placed: 5    Attending surgeon  Evaristo Up MD    Anesthesia  1% lidocaine injected blanca-prostatic      Complications  None    Indications  72 y.o. male with a history of intermediate risk prostate cancer, who has elected to proceed with external beam radiation therapy.  He presents today for placement of gold fiducial marker to guide radiation therapy planning.  Informed consent was reviewed and signed after discussion of risks, benefits, alternatives.    Findings  Uncomplicated placement of 5 gold fiducial markers, placement performed at left and right lateral base, left and right lateral apex, and 1 markers placed in a medial location at the left midportion of the prostate.     Procedure  The patient was identified, informed consent was reviewed and signed.  I confirmed with the patient that he has been taking his preprocedure antibiotics appropriately.  He was placed in the left lateral position, with knees to chest.  The ultrasound probe was inserted into the patient's rectum.  The prostate was identified.  5 mL of 1% lidocaine was injected along the neurovascular bundle in the left side.  I then performed the same anesthetic injection on the left side.  Next starting at the base of the prostate on the patient's left side, I placed one gold marker in a lateral position.  I then moved to the left apex and placed another gold marker in a lateral position.  I then switched to the right side and performed the identical procedure, placing a gold marker on the right lateral position, and then at the apex in a right lateral position.  I moved to the mid prostate and placed a medial marker at the right mid prostate.  A total of 5 gold markers were used.  No significant bleeding was encountered.  The rectal probe was  held in place for 3 minutes to confirm hemostasis.  The rectal probe was removed and there was no significant blood per rectum noted.  The patient tolerated the procedure well denies significant pain or discomfort.    Follow Up:   -F/u with Radiation Oncology as planned for radiation therapy planning  -Return to my clinic in 6 months with a repeat PSA prior and for continued prostate cancer follow-up  -Return precautions discussed, including significant hematuria, significant blood per rectum, fevers, chills, nausea, vomiting. Patient was instructed to call my office or present to the nearest emergency department with these concerns.    Evaristo Up MD

## 2023-02-15 ENCOUNTER — DOCUMENTATION (OUTPATIENT)
Dept: NUTRITION | Facility: HOSPITAL | Age: 73
End: 2023-02-15
Payer: MEDICARE

## 2023-02-20 ENCOUNTER — OFFICE VISIT (OUTPATIENT)
Dept: RADIATION ONCOLOGY | Facility: HOSPITAL | Age: 73
End: 2023-02-20
Payer: MEDICARE

## 2023-02-20 ENCOUNTER — HOSPITAL ENCOUNTER (OUTPATIENT)
Dept: RADIATION ONCOLOGY | Facility: HOSPITAL | Age: 73
Setting detail: RADIATION/ONCOLOGY SERIES
Discharge: HOME | End: 2023-02-20
Payer: MEDICARE

## 2023-02-20 ENCOUNTER — HOSPITAL ENCOUNTER (OUTPATIENT)
Dept: MRI IMAGING | Facility: HOSPITAL | Age: 73
Discharge: HOME | End: 2023-02-20
Admitting: RADIOLOGY
Payer: MEDICARE

## 2023-02-20 ENCOUNTER — HOSPITAL ENCOUNTER (OUTPATIENT)
Dept: RADIATION ONCOLOGY | Facility: HOSPITAL | Age: 73
Discharge: HOME | End: 2023-02-20

## 2023-02-20 VITALS
HEART RATE: 76 BPM | HEIGHT: 71 IN | WEIGHT: 161 LBS | SYSTOLIC BLOOD PRESSURE: 164 MMHG | DIASTOLIC BLOOD PRESSURE: 76 MMHG | RESPIRATION RATE: 16 BRPM | TEMPERATURE: 97.2 F | BODY MASS INDEX: 22.54 KG/M2 | OXYGEN SATURATION: 98 %

## 2023-02-20 DIAGNOSIS — C61 PROSTATE CANCER: ICD-10-CM

## 2023-02-20 PROCEDURE — 77399 UNLISTED PX MED RADJ PHYSICS: CPT | Performed by: RADIOLOGY

## 2023-02-20 PROCEDURE — 72195 MRI PELVIS W/O DYE: CPT

## 2023-02-20 PROCEDURE — G0463 HOSPITAL OUTPT CLINIC VISIT: HCPCS | Performed by: RADIOLOGY

## 2023-02-20 RX ORDER — TAMSULOSIN HYDROCHLORIDE 0.4 MG/1
1 CAPSULE ORAL DAILY
Qty: 30 CAPSULE | Refills: 2 | Status: SHIPPED | OUTPATIENT
Start: 2023-02-20

## 2023-02-20 NOTE — PROGRESS NOTES
RE-EVALUATION    PATIENT:                                                      Dakotah Verma  :                                                          1950  DATE:                          2023   DIAGNOSIS:     Prostate cancer (HCC)  - Stage IIB (cT1c, cN0, cM0, PSA: 8.7, Grade Group: 2)     BRIEF HISTORY:  The patient is a very pleasant 72 y.o. male  with a new diagnosis of a favorable intermediate risk prostate cancer.  I last saw him a little over 1 month ago when we discussed several different treatment options and he was most interested in pursuing CyberKnife radiosurgery.  He has since underwent gold seed fiducial placement, and returns to my clinic today for reevaluation prior to undergoing treatment planning.    Allergies   Allergen Reactions   • Sulfa Antibiotics Hives and Itching       Review of Systems   All other systems reviewed and are negative.         IPSS Questionnaire (AUA-7):  Over the past month…     1)  Incomplete Emptying  How often have you had a sensation of not emptying your bladder?  0 - Not at all   2)  Frequency  How often have you had to urinate less than every two hours? 1 - Less than 1 time in 5   3)  Intermittency  How often have you found you stopped and started again several times when you urinated?  0 - Not at all   4) Urgency  How often have you found it difficult to postpone urination?  0 - Not at all   5) Weak Stream  How often have you had a weak urinary stream?  0 - Not at all   6) Straining  How often have you had to push or strain to begin urination?  0 - Not at all   7) Nocturia  How many times did you typically get up at night to urinate?  1 - 1 time   Total Score:  2         Quality of life due to urinary symptoms:  If you were to spend the rest of your life with your urinary condition the way it is now, how would you feel about that? 1-Pleased   Urine Leakage (Incontinence) 0-No Leakage      Sexual Health Inventory  Current Status     1)  How do you  "rate your confidence that you could achieve and keep an erection? 2-Low   2) When you had erections with sexual stimulation, how often were your erections hard enough for penetration (entering your partner)? 0-No sexual activity   3)  During sexual intercourse, how often were you able to maintain your erection after you had penetrated (entered) into your partner? 0-Did not attempt intercourse   4) During sexual intercourse, how difficult was it to maintain your erection to completion of intercourse? 0-Did not attempt intercourse   5) When you attempted sexual intercourse, how often was it satisfactory to you? 0-No sexual activity   Total Score: 2         Bowel Health Inventory  Current Status: 0-No problems, no rectal bleeding, no discharge, less then 5 bowel movements a day              Objective   VITAL SIGNS:   Vitals:    02/20/23 1009   BP: 164/76   Pulse: 76   Resp: 16   Temp: 97.2 °F (36.2 °C)   TempSrc: Temporal   SpO2: 98%  Comment: RA   Weight: 73 kg (161 lb)   Height: 180.3 cm (71\")   PainSc: 0-No pain        Karnofsky score: 80       Physical Exam  Vitals and nursing note reviewed.   Constitutional:       General: He is not in acute distress.     Appearance: He is well-developed.   HENT:      Head: Normocephalic and atraumatic.   Eyes:      Conjunctiva/sclera: Conjunctivae normal.      Pupils: Pupils are equal, round, and reactive to light.   Cardiovascular:      Rate and Rhythm: Normal rate and regular rhythm.      Heart sounds: No murmur heard.    No friction rub.   Pulmonary:      Effort: Pulmonary effort is normal.      Breath sounds: Normal breath sounds. No wheezing.   Abdominal:      General: Bowel sounds are normal. There is no distension.      Palpations: Abdomen is soft. There is no mass.      Tenderness: There is no abdominal tenderness.   Musculoskeletal:         General: Normal range of motion.      Cervical back: Normal range of motion and neck supple.   Lymphadenopathy:      Cervical: No " cervical adenopathy.   Skin:     General: Skin is warm and dry.   Neurological:      Mental Status: He is alert and oriented to person, place, and time.   Psychiatric:         Behavior: Behavior normal.         Thought Content: Thought content normal.         Judgment: Judgment normal.          The following portions of the patient's history were reviewed and updated as appropriate: allergies, current medications, past family history, past medical history, past social history, past surgical history and problem list.    Diagnoses and all orders for this visit:    Prostate cancer (HCC)  -     tamsulosin (FLOMAX) 0.4 MG capsule 24 hr capsule; Take 1 capsule by mouth Daily. Start 2 days prior to starting radiation treatments      IMPRESSION: Mr. Verma is a 72-year-old gentleman with a clinical T1c, [place] 3+4 = 7 adenocarcinoma of the prostate with a pretreatment PSA of 8.7 NG/mL.  He completed CyberKnife simulation and treatment planning today.  I will be working on his treatment plan with my physicist over the next week.  I anticipate treating his prostate to a dose of 35 Gy in 5 fractions of 7 Gy each.  I reiterated the importance of the low fiber diet, daily bowel prep, and prophylactic treatment with alpha-blockers in terms of managing his acute side effects.  I sent a prescription of Flomax to his pharmacy.  He should be ready to begin treatment in the next 1-2 weeks pending insurance authorization.    I spent a total of 30 minutes on today's visit, with 20 minutes in direct face-to-face communication, and the remainder spent in reviewing his recent medical records and for documentation.    RECOMMENDATIONS:    Return to clinic in 1 to 2 weeks to begin CyberKnife treatments     Ezio Montilla MD

## 2023-02-27 PROCEDURE — 77300 RADIATION THERAPY DOSE PLAN: CPT | Performed by: RADIOLOGY

## 2023-02-27 PROCEDURE — 77301 RADIOTHERAPY DOSE PLAN IMRT: CPT | Performed by: RADIOLOGY

## 2023-02-27 PROCEDURE — 77338 DESIGN MLC DEVICE FOR IMRT: CPT | Performed by: RADIOLOGY

## 2023-03-01 ENCOUNTER — HOSPITAL ENCOUNTER (OUTPATIENT)
Dept: RADIATION ONCOLOGY | Facility: HOSPITAL | Age: 73
Setting detail: RADIATION/ONCOLOGY SERIES
Discharge: HOME | End: 2023-03-01
Payer: MEDICARE

## 2023-03-02 ENCOUNTER — OFFICE VISIT (OUTPATIENT)
Dept: FAMILY MEDICINE CLINIC | Facility: CLINIC | Age: 73
End: 2023-03-02
Payer: MEDICARE

## 2023-03-02 VITALS
DIASTOLIC BLOOD PRESSURE: 82 MMHG | WEIGHT: 174 LBS | BODY MASS INDEX: 24.27 KG/M2 | SYSTOLIC BLOOD PRESSURE: 146 MMHG | OXYGEN SATURATION: 98 % | HEART RATE: 88 BPM

## 2023-03-02 DIAGNOSIS — E11.9 TYPE 2 DIABETES MELLITUS WITHOUT COMPLICATION, WITHOUT LONG-TERM CURRENT USE OF INSULIN: Primary | ICD-10-CM

## 2023-03-02 DIAGNOSIS — K21.00 GASTROESOPHAGEAL REFLUX DISEASE WITH ESOPHAGITIS WITHOUT HEMORRHAGE: ICD-10-CM

## 2023-03-02 PROCEDURE — 99213 OFFICE O/P EST LOW 20 MIN: CPT | Performed by: FAMILY MEDICINE

## 2023-03-02 RX ORDER — PREDNISONE 20 MG/1
TABLET ORAL
Qty: 30 TABLET | Refills: 0 | Status: SHIPPED | OUTPATIENT
Start: 2023-03-02

## 2023-03-02 NOTE — PROGRESS NOTES
Office Note     Name: Dakotah Verma    : 1950     MRN: [id number]     Chief Complaint  Hand Pain (R hand pain and swelling X 2 days)    Subjective     History of Present Illness:  Dakotah Verma is a 72 y.o. male who presents today for severe wrist and hand pain.  It is keeping him up the last 2 nights.  Did not landed on it.  He took a pocket knife and clean coaster wheels x6 after having done an hour and a half he quit because it was sore and he is got a tendinitis picture more there right wrist extension.  Concerned about the fleets enema he do Tuesday morning and in the CyberKnife x5 treatments.    Review of Systems:   Review of Systems    Past Medical History:   Past Medical History:   Diagnosis Date   • DM (diabetes mellitus) (HCC)    • Fatty liver    • GERD with esophagitis    • Nonalcoholic fatty liver disease    • Prostate cancer (HCC)    • Prostatitis    • Sciatica, left side    • Speech abnormality     Pt has stuttered as a child, sometimes  has some trouble getting words out.   • Tonsillitis    • Vitamin D deficiency        Past Surgical History:   Past Surgical History:   Procedure Laterality Date   • ADENOIDECTOMY     • COLONOSCOPY         • LUMBAR DISCECTOMY  10/30/2014   • PROSTATE BIOPSY Bilateral 2022    Procedure: PROSTATE ULTRASOUND BIOPSY MRI FUSION WITH URONAV;  Surgeon: Evaristo Up MD;  Location: [facility];  Service: Urology;  Laterality: Bilateral;   • TONSILLECTOMY         Family History:   Family History   Problem Relation Age of Onset   • Diabetes type II Mother    • Hypertension Mother         PULMONARY   • Stroke Mother    • Mental illness Mother    • COPD Father    • Diabetes type II Sister    • Asthma Sister    • Other Sister         SPINAL BIFIDA       Social History:   Social History     Socioeconomic History   • Marital status:    Tobacco Use   • Smoking status: Never   • Smokeless tobacco: Never   Vaping Use   • Vaping Use: Never used    Substance and Sexual Activity   • Alcohol use: No   • Drug use: No   • Sexual activity: Not Currently       Immunizations:   Immunization History   Administered Date(s) Administered   • COVID-19 (MODERNA) 1st, 2nd, 3rd Dose Only 03/23/2021, 04/20/2021, 11/19/2021   • Fluzone High-Dose 65+yrs 08/21/2020, 10/05/2021, 10/23/2022   • Fluzone Quad >6mos (Multi-dose) 08/16/2019   • Hepatitis A 08/16/2019, 02/11/2020   • Hepatitis B 04/01/1997, 05/01/1997   • Hepatitis B 2 Dose Vaccine Heplisav-B 07/19/2022, 08/19/2022   • Influenza, Unspecified 08/21/2020   • Pneumococcal Conjugate 13-Valent (PCV13) 06/06/2017   • Pneumococcal, Unspecified 03/28/2013   • Shingrix 08/21/2020, 11/20/2020   • Tdap 08/16/2019   • Zoster, Unspecified 04/16/2017, 08/21/2020, 11/20/2020        Medications:     Current Outpatient Medications:   •  Accu-Chek Leah Plus test strip, TEST ONCE DAILY, Disp: 100 each, Rfl: 3  •  Accu-Chek Softclix Lancets lancets, CHECK BLOOD SUGAR EVERY DAY OR AS DIRECTED FOR DIABETES, Disp: 100 each, Rfl: 2  •  docusate sodium (COLACE) 100 MG capsule, Take 1 capsule by mouth 2 (Two) Times a Day., Disp: , Rfl:   •  lisinopril (PRINIVIL,ZESTRIL) 5 MG tablet, Take 1 tablet by mouth Daily., Disp: 90 tablet, Rfl: 3  •  metFORMIN ER (GLUCOPHAGE-XR) 500 MG 24 hr tablet, Take 1 tablet twice a day with meals, Disp: 180 tablet, Rfl: 1  •  omeprazole (priLOSEC) 20 MG capsule, Take 1 capsule by mouth Daily., Disp: 90 capsule, Rfl: 3  •  tamsulosin (FLOMAX) 0.4 MG capsule 24 hr capsule, Take 1 capsule by mouth Daily. Start 2 days prior to starting radiation treatments, Disp: 30 capsule, Rfl: 2  •  vitamin B-12 (CYANOCOBALAMIN) 1000 MCG tablet, Take 1 tablet by mouth Daily., Disp: , Rfl:   •  predniSONE (DELTASONE) 20 MG tablet, 3 tablets qd x 5 days, 2 tabs qd x 5d, 1 qd x 5 days, Disp: 30 tablet, Rfl: 0    Allergies:   Allergies   Allergen Reactions   • Sulfa Antibiotics Hives and Itching       Objective     Vital Signs  BP  "146/82 (BP Location: Left arm, Patient Position: Sitting)   Pulse 88   Wt 78.9 kg (174 lb)   SpO2 98%   BMI 24.27 kg/m²   Estimated body mass index is 24.27 kg/m² as calculated from the following:    Height as of 2/20/23: 180.3 cm (71\").    Weight as of this encounter: 78.9 kg (174 lb).    BMI is within normal parameters. No other follow-up for BMI required.      Physical Exam  Constitutional:       General: He is in acute distress.      Appearance: He is ill-appearing and toxic-appearing.   Skin:            Comments: Area of redness, 2+ puffiness, and 3+ decreased range of motion.  Could not make a fist          Procedures     Assessment and Plan     1. Type 2 diabetes mellitus without complication, without long-term current use of insulin ([facility])  Warning that the prednisone may make his sugar go up and states that instead of taking 2 will allow for metformin  mg    2. Gastroesophageal reflux disease with esophagitis without hemorrhage  Stable    3.  A riproaring hand extensor tendinitis right  Prednisone 60 mg daily x5 days 40 mg daily x5 days and 20 mg daily x5 days       Follow Up  No follow-ups on file.    David BLACKMON [facility]  [address] [place] [id number] [phone]  "

## 2023-03-03 ENCOUNTER — TELEPHONE (OUTPATIENT)
Dept: RADIATION ONCOLOGY | Facility: HOSPITAL | Age: 73
End: 2023-03-03
Payer: MEDICARE

## 2023-03-03 NOTE — TELEPHONE ENCOUNTER
Pt called with complaints of tendonitis in hand and worried he will not be able to give himself the enema for Tuesdays appointment, PCP started steroids.  Instructed pt to call Monday with update after steroids.

## 2023-03-07 ENCOUNTER — HOSPITAL ENCOUNTER (OUTPATIENT)
Dept: RADIATION ONCOLOGY | Facility: HOSPITAL | Age: 73
Discharge: HOME | End: 2023-03-07

## 2023-03-07 PROCEDURE — 77373 STRTCTC BDY RAD THER TX DLVR: CPT | Performed by: RADIOLOGY

## 2023-03-08 ENCOUNTER — HOSPITAL ENCOUNTER (OUTPATIENT)
Dept: RADIATION ONCOLOGY | Facility: HOSPITAL | Age: 73
Discharge: HOME | End: 2023-03-08

## 2023-03-08 PROCEDURE — 77373 STRTCTC BDY RAD THER TX DLVR: CPT | Performed by: RADIOLOGY

## 2023-03-09 ENCOUNTER — HOSPITAL ENCOUNTER (OUTPATIENT)
Dept: RADIATION ONCOLOGY | Facility: HOSPITAL | Age: 73
Discharge: HOME | End: 2023-03-09

## 2023-03-09 PROCEDURE — 77373 STRTCTC BDY RAD THER TX DLVR: CPT | Performed by: RADIOLOGY

## 2023-03-10 ENCOUNTER — HOSPITAL ENCOUNTER (OUTPATIENT)
Dept: RADIATION ONCOLOGY | Facility: HOSPITAL | Age: 73
Discharge: HOME | End: 2023-03-10

## 2023-03-10 PROCEDURE — 77373 STRTCTC BDY RAD THER TX DLVR: CPT | Performed by: RADIOLOGY

## 2023-03-13 ENCOUNTER — HOSPITAL ENCOUNTER (OUTPATIENT)
Dept: RADIATION ONCOLOGY | Facility: HOSPITAL | Age: 73
Discharge: HOME | End: 2023-03-13

## 2023-03-13 PROCEDURE — 77336 RADIATION PHYSICS CONSULT: CPT | Performed by: RADIOLOGY

## 2023-03-13 PROCEDURE — 77373 STRTCTC BDY RAD THER TX DLVR: CPT | Performed by: RADIOLOGY

## 2023-03-20 ENCOUNTER — TELEPHONE (OUTPATIENT)
Dept: RADIATION ONCOLOGY | Facility: HOSPITAL | Age: 73
End: 2023-03-20
Payer: MEDICARE

## 2023-03-20 NOTE — TELEPHONE ENCOUNTER
Pt called with c/o pain with urination, frequency,urgency and feeling like he needs to have BM (reports soft stool-denies diarrhea, however hemorrhoids are irritated) states taking flomax once per day- denies weak stream or hard to start stream- after discussion with Dr. Mclaughlin I notified pt a script was sent to pt pharmacy per Dr. Mclaughlin for medrol dose pack and flomax BID ( 12 hours apart)-pt verbalized understanding and aware to call with any further c/o or concerns

## 2023-04-13 ENCOUNTER — OFFICE VISIT (OUTPATIENT)
Dept: RADIATION ONCOLOGY | Facility: HOSPITAL | Age: 73
End: 2023-04-13
Payer: MEDICARE

## 2023-04-13 ENCOUNTER — HOSPITAL ENCOUNTER (OUTPATIENT)
Dept: RADIATION ONCOLOGY | Facility: HOSPITAL | Age: 73
Setting detail: RADIATION/ONCOLOGY SERIES
Discharge: HOME | End: 2023-04-13
Payer: MEDICARE

## 2023-04-13 DIAGNOSIS — C61 PROSTATE CANCER: Primary | ICD-10-CM

## 2023-04-13 NOTE — PROGRESS NOTES
FOLLOW UP NOTE    PATIENT:                                                      Dakotah Verma  MEDICAL RECORD #:                        [id number]  :                                                          1950  COMPLETION DATE:   3/13/2023  DIAGNOSIS:     Prostate cancer  - Stage IIB (cT1c, cN0, cM0, PSA: 8.7, Grade Group: 2)    This visit has been converted to a telehealth virtual visit, the patient's preferred method for today's follow-up.   Total time of discussion was 13 minutes.  The patient has given verbal consent.      BRIEF HISTORY:  Dakotah Verma is a 72 y.o. gentleman presenting via telemedicine for initial follow-up visit.  He has a recent diagnosis of a favorable intermediate risk prostate cancer.  He underwent definitive treatment with a course of CyberKnife stereotactic body radiotherapy.  He tolerated treatment well.  He did experience exacerbation of urinary frequency, urgency, and dysuria that resolved with completion of short steroid taper and increasing dose of Flomax to twice daily.  He reports urinary voiding is now at baseline, citing IPSS score of 2 accounting for symptoms of frequency less than 20% of the time and nocturia x1.  He is back down to Flomax once daily.  He experienced a brief duration of tenesmus that has since resolved.  He otherwise denies change in bowel function or acute GI side effects.  He continues with chronic ED but is not currently sexually active.  He did not experience exacerbation of fatigue.  No new aches or pains.  Overall, he feels well.      IPSS Questionnaire (AUA-7):  Over the past month…    1)  Incomplete Emptying  How often have you had a sensation of not emptying your bladder?  0 - Not at all   2)  Frequency  How often have you had to urinate less than every two hours? 1 - Less than 1 time in 5   3)  Intermittency  How often have you found you stopped and started again several times when you urinated?  0 - Not at all   4) Urgency  How often have  you found it difficult to postpone urination?  0 - Not at all   5) Weak Stream  How often have you had a weak urinary stream?  0 - Not at all   6) Straining  How often have you had to push or strain to begin urination?  0 - Not at all   7) Nocturia  How many times did you typically get up at night to urinate?  1 - 1 time   Total Score:  2       Quality of life due to urinary symptoms:  If you were to spend the rest of your life with your urinary condition the way it is now, how would you feel about that? 1-Pleased   Urine Leakage (Incontinence) 0-No Leakage     Sexual Health Inventory  Current Status    1)  How do you rate your confidence that you could achieve and keep an erection? 2-Low   2) When you had erections with sexual stimulation, how often were your erections hard enough for penetration (entering your partner)? 0-No sexual activity   3)  During sexual intercourse, how often were you able to maintain your erection after you had penetrated (entered) into your partner? 0-Did not attempt intercourse   4) During sexual intercourse, how difficult was it to maintain your erection to completion of intercourse? 0-Did not attempt intercourse   5) When you attempted sexual intercourse, how often was it satisfactory to you? 0-No sexual activity   Total Score: 2       Bowel Health Inventory  Current Status: 0-No problems, no rectal bleeding, no discharge, less then 5 bowel movements a day         MEDICATIONS: Medication reconciliation for the patient was reviewed and confirmed in the electronic medical record.    Review of Systems   All other systems reviewed and are negative.          KPS 90%      Physical Exam  Pulmonary:      Respirations even, unlabored. No audible wheezing or cough.  Neurological:      A+Ox4, conversant, answers questions appropriately.  Psychiatric:     Judgement, affect, and decision-making WNL.    Limited physical exam as visit was conducted remotely via telephone.            The following  portions of the patient's history were reviewed and updated as appropriate: allergies, current medications, past family history, past medical history, past social history, past surgical history and problem list.         Diagnoses and all orders for this visit:    1. Prostate cancer (Primary)         IMPRESSION:  Mr. Verma is a 72-year-old gentleman with a clinical T1c, low-volume [place] 3+4 = 7 adenocarcinoma of the prostate with a pretreatment PSA of 8.7 NG/mL.    He is 1 month status post CyberKnife SBRT.  He tolerated treatment well.  He developed acute grade 1 urinary and bowel radiation-related toxicities, typical for treatment, that were managed conservatively and have appropriately resolved at this point.  We discussed plan to taper/discontinue Flomax, as tolerated.  Mr. Verma and I have reviewed the survivorship care plan in detail.  We discussed diagnosis, follow-up intervals, biannual PSA monitoring, and expectations for response to treatment.  A copy of the care plan has been mailed the patient.  A copy has also been sent to his PCP.    RECOMMENDATIONS:  Mr. Verma continues routine urologic surveillance under the care of Dr. Up, with follow-up and repeat PSA scheduled in September 2023.  We will schedule the patient to return to our clinic in 6 months.        Return in about 6 months (around 10/13/2023) for Office Visit.    YAKOV Rao spent a total of 25 minutes on today's visit, with more than 13 minutes in virtual communication with the patient via telephone, and the remainder of the time spent in reviewing the relevant history, records, available imaging, and for documentation.

## 2023-04-29 RX ORDER — LISINOPRIL 5 MG/1
5 TABLET ORAL DAILY
Qty: 90 TABLET | Refills: 3 | OUTPATIENT
Start: 2023-04-29

## 2023-04-29 RX ORDER — OMEPRAZOLE 20 MG/1
20 CAPSULE, DELAYED RELEASE ORAL DAILY
Qty: 90 CAPSULE | Refills: 3 | OUTPATIENT
Start: 2023-04-29

## 2023-05-10 RX ORDER — LANCETS
EACH MISCELLANEOUS
Qty: 100 EACH | Refills: 2 | Status: SHIPPED | OUTPATIENT
Start: 2023-05-10

## 2023-08-04 ENCOUNTER — OFFICE VISIT (OUTPATIENT)
Dept: FAMILY MEDICINE CLINIC | Facility: CLINIC | Age: 73
End: 2023-08-04
Payer: MEDICARE

## 2023-08-04 VITALS
SYSTOLIC BLOOD PRESSURE: 124 MMHG | BODY MASS INDEX: 23.85 KG/M2 | TEMPERATURE: 98.2 F | DIASTOLIC BLOOD PRESSURE: 66 MMHG | WEIGHT: 171 LBS | OXYGEN SATURATION: 98 % | HEART RATE: 78 BPM

## 2023-08-04 DIAGNOSIS — C61 PROSTATE CANCER: ICD-10-CM

## 2023-08-04 DIAGNOSIS — D53.1 MEGALOBLASTIC ANEMIA: ICD-10-CM

## 2023-08-04 DIAGNOSIS — R53.83 FATIGUE, UNSPECIFIED TYPE: ICD-10-CM

## 2023-08-04 DIAGNOSIS — E55.9 VITAMIN D DEFICIENCY: ICD-10-CM

## 2023-08-04 DIAGNOSIS — E11.9 TYPE 2 DIABETES MELLITUS WITHOUT COMPLICATION, WITHOUT LONG-TERM CURRENT USE OF INSULIN: Primary | ICD-10-CM

## 2023-08-04 DIAGNOSIS — E78.2 MIXED HYPERLIPIDEMIA: ICD-10-CM

## 2023-08-04 RX ORDER — METFORMIN HYDROCHLORIDE 500 MG/1
TABLET, EXTENDED RELEASE ORAL
Qty: 180 TABLET | Refills: 1 | Status: SHIPPED | OUTPATIENT
Start: 2023-08-04

## 2023-08-05 LAB
25(OH)D3+25(OH)D2 SERPL-MCNC: 30.3 NG/ML (ref 30–100)
CHOLEST SERPL-MCNC: 144 MG/DL (ref 100–199)
FOLATE SERPL-MCNC: 16.3 NG/ML
HBA1C MFR BLD: 6.5 % (ref 4.8–5.6)
HDLC SERPL-MCNC: 49 MG/DL
LDLC SERPL CALC-MCNC: 71 MG/DL (ref 0–99)
TRIGL SERPL-MCNC: 139 MG/DL (ref 0–149)
TSH SERPL DL<=0.005 MIU/L-ACNC: 2.26 UIU/ML (ref 0.45–4.5)
VIT B12 SERPL-MCNC: 537 PG/ML (ref 232–1245)
VLDLC SERPL CALC-MCNC: 24 MG/DL (ref 5–40)

## 2023-08-30 ENCOUNTER — LAB (OUTPATIENT)
Dept: LAB | Facility: HOSPITAL | Age: 73
End: 2023-08-30
Payer: MEDICARE

## 2023-08-30 PROCEDURE — 84153 ASSAY OF PSA TOTAL: CPT | Performed by: STUDENT IN AN ORGANIZED HEALTH CARE EDUCATION/TRAINING PROGRAM

## 2023-09-07 ENCOUNTER — OFFICE VISIT (OUTPATIENT)
Dept: UROLOGY | Facility: CLINIC | Age: 73
End: 2023-09-07
Payer: MEDICARE

## 2023-09-07 VITALS — HEIGHT: 71 IN | BODY MASS INDEX: 23.85 KG/M2

## 2023-09-07 DIAGNOSIS — C61 PROSTATE CANCER: Primary | ICD-10-CM

## 2023-09-07 DIAGNOSIS — B36.9 FUNGAL INFECTION OF SKIN: ICD-10-CM

## 2023-09-07 LAB
BILIRUB BLD-MCNC: NEGATIVE MG/DL
CLARITY, POC: CLEAR
COLOR UR: YELLOW
EXPIRATION DATE: NORMAL
GLUCOSE UR STRIP-MCNC: NORMAL MG/DL
KETONES UR QL: NEGATIVE
LEUKOCYTE EST, POC: NEGATIVE
Lab: NORMAL
NITRITE UR-MCNC: NEGATIVE MG/ML
PH UR: 6 [PH] (ref 5–8)
PROT UR STRIP-MCNC: NEGATIVE MG/DL
RBC # UR STRIP: NEGATIVE /UL
SP GR UR: 1.02 (ref 1–1.03)
UROBILINOGEN UR QL: NORMAL

## 2023-09-07 RX ORDER — CLOTRIMAZOLE AND BETAMETHASONE DIPROPIONATE 10; .64 MG/G; MG/G
1 CREAM TOPICAL 2 TIMES DAILY
Qty: 45 G | Refills: 3 | Status: SHIPPED | OUTPATIENT
Start: 2023-09-07

## 2023-09-07 NOTE — PROGRESS NOTES
Follow Up Office Visit      Patient Name: Dakotah Verma  : 1950   MRN: [id number]     Chief Complaint:    Chief Complaint   Patient presents with    Prostate Cancer       Referring Provider: No ref. provider found    History of Present Illness: Dakotah Verma is a 72 y.o. male who presents today for follow up of prostate cancer. History of HTN, DMII, Vit D deficiency. History of elevated PSA. Diagnosed with grade group 2 prostate cancer.  Underwent XRT, completed 2023.  Avoid ADT.  PSA has appropriate decline.  He has no urinary concerns.      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  Mildly tender, nonpainful, mild erythema is present.    Subjective      Review of System: Review of Systems   Genitourinary:  Negative for decreased urine volume, difficulty urinating, dysuria, enuresis, flank pain, frequency, hematuria and urgency.    I have reviewed the ROS documented by my clinical staff, I have updated appropriately and I agree. Evaristo Up MD    I have reviewed and the following portions of the patient's history were updated as appropriate: past family history, past medical history, past social history, past surgical history and problem list.    Medications:     Current Outpatient Medications:     Accu-Chek Leah Plus test strip, TEST ONCE DAILY, Disp: 100 each, Rfl: 3    Accu-Chek Softclix Lancets lancets, TEST EVERY DAY OR AS DIRECTED FOR DIABETES, Disp: 100 each, Rfl: 2    lisinopril (PRINIVIL,ZESTRIL) 5 MG tablet, Take 1 tablet by mouth Daily., Disp: 90 tablet, Rfl: 3    metFORMIN ER (GLUCOPHAGE-XR) 500 MG 24 hr tablet, Take 1 tablet twice a day with meals, Disp: 180 tablet, Rfl: 1    omeprazole (priLOSEC) 20 MG capsule, Take 1 capsule by mouth Daily., Disp: 90 capsule, Rfl: 3    vitamin B-12  (CYANOCOBALAMIN) 1000 MCG tablet, Take 1 tablet by mouth Daily., Disp: , Rfl:     clotrimazole-betamethasone (LOTRISONE) 1-0.05 % cream, Apply 1 application  topically to the appropriate area as directed 2 (Two) Times a Day., Disp: 45 g, Rfl: 3    Allergies:   Allergies   Allergen Reactions    Sulfa Antibiotics Hives and Itching       IPSS Questionnaire (AUA-7):  Over the past month…    1)  Incomplete Emptying:       How often have you had a sensation of not emptying you had the sensation of not emptying your bladder completely after you finished urinating?  0 - Not at all   2)  Frequency:       How often have you had the urinate again less than two hours after you finished urinating?  0 - Not at all   3)  Intermittency:       How often have you found you stopped and started again several times when you urinated?   0 - Not at all   4) Urgency:      How often have you found it difficult to postpone urination?  1 - Less than 1 time in 5   5) Weak Stream:      How often have you had a weak urinary stream?  0 - Not at all   6) Straining:       How often have you had to push or strain to begin urination?  0 - Not at all   7) Nocturia:      How many times did you most typically get up to urinate from the time you went to bed at night until the time you got up in the morning?  1 - 1 time   Total Score:  2   The International Prostate Symptom Score (IPSS) is used to screen, diagnose, track symptoms of benign prostatic hyperplasia (BPH).   0-7 (Mild Symptoms) 8-19 (Moderate) 20-35 (Severe)   Quality of Life (QoL):  If you were to spend the rest of your life with your urinary condition just the way it is now, how would you feel about that? 0-Delighted   Urine Leakage (Incontinence) 0-No Leakage     Sexual Health Inventory for Men (ROHITH)   Over the past 6 months:     1. How do you rate your confidence that you could get and keep an erection?  3 - Moderate   2. When you had erections with sexual  stimulation, how often were  "your erections hard enough for penetration (entering your partner)?  0 - No Sexual Activity    3. During sexual intercourse, how often were you able to maintain your erection after you had penetrated (entered) your partner?  0 - Did not attempt intercourse   4. During sexual intercourse, how difficult was it to maintain your erection to completion of intercourse?  0 - Did not attempt intercourse   5. When you attempted sexual intercourse, how often was it satisfactory for you?  0 - Did not attempt intercourse    Total Score: 3   The Sexual Health Inventory for Men further classifies ED severity with the following breakpoints:   1-7 (Severe ED) 8-11 (Moderate ED) 12-16 (Mild to Moderate ED) 17-21 (Mild ED)      Post void residual bladder scan:   0 mL     Objective     Physical Exam:   Vital Signs:   Vitals:    09/07/23 1102   Height: 180.3 cm (71\")     Body mass index is 23.85 kg/m².     Physical Exam  Vitals and nursing note reviewed.   Constitutional:       Appearance: Normal appearance.   HENT:      Head: Normocephalic and atraumatic.      Mouth/Throat:      Mouth: Mucous membranes are moist.      Pharynx: Oropharynx is clear.   Eyes:      Extraocular Movements: Extraocular movements intact.      Conjunctiva/sclera: Conjunctivae normal.   Cardiovascular:      Rate and Rhythm: Normal rate and regular rhythm.   Pulmonary:      Effort: Pulmonary effort is normal. No respiratory distress.   Abdominal:      Palpations: Abdomen is soft.      Tenderness: There is no abdominal tenderness. There is no right CVA tenderness or left CVA tenderness.   Genitourinary:     Comments:  Circumcised phallus, orthotopic meatus, bilaterally descended testicles without masses, or lesions.  1 x 1 cm area of erythema, cracking skin consistent with likely foreskin balanitis/fungal infection.     Musculoskeletal:         General: Normal range of motion.      Cervical back: Normal range of motion.   Skin:     General: Skin is warm and dry. "   Neurological:      General: No focal deficit present.      Mental Status: He is alert and oriented to person, place, and time.   Psychiatric:         Mood and Affect: Mood normal.         Behavior: Behavior normal.       Labs:   Brief Urine Lab Results  (Last result in the past 365 days)        Color   Clarity   Blood   Leuk Est   Nitrite   Protein   CREAT   Urine HCG        09/07/23 1115 Yellow   Clear   Negative   Negative   Negative   Negative                   Urine Culture          11/15/2022    18:53   Urine Culture   Urine Culture No growth         Lab Results   Component Value Date    GLUCOSE 116 (H) 11/02/2022    CALCIUM 9.9 11/02/2022     11/02/2022    K 4.4 11/02/2022    CO2 25.0 11/02/2022     11/02/2022    BUN 12 11/02/2022    CREATININE 0.70 (L) 11/02/2022    EGFRIFNONA 98 07/08/2016    BCR 17.1 11/02/2022    ANIONGAP 13.0 11/02/2022       Lab Results   Component Value Date    WBC 8.96 11/02/2022    HGB 14.8 11/02/2022    HCT 44.3 11/02/2022    MCV 96.7 11/02/2022     11/02/2022       Images:   No Images in the past 120 days found..    Measures:   Tobacco:   Dakotah Verma  reports that he has never smoked. He has never used smokeless tobacco..     Assessment / Plan      Assessment/Plan:   72 y.o. male who presented today for follow up of prostate cancer status post XRT, grade group 2 disease.  Doing well, PSA is appropriately declined.  I will see him back in 6 months with repeat PSA.  He has a mild area of likely fungal infection of the right lateral penile shaft, 1 x 1 cm.  He states this has been present for months.  I will treat him with antifungal and steroid combination ointment.  If no improvement, he may require a biopsy to rule out abnormal skin infection/malignancy.  Patient reports understanding.  He will keep me updated.    Diagnoses and all orders for this visit:    1. Prostate cancer (Primary)  -     POC Urinalysis Dipstick, Automated  -     PSA Diagnostic;  Future    2. Fungal infection of skin  -     clotrimazole-betamethasone (LOTRISONE) 1-0.05 % cream; Apply 1 application  topically to the appropriate area as directed 2 (Two) Times a Day.  Dispense: 45 g; Refill: 3           Follow Up:   Return in about 6 months (around 3/7/2024) for Follow Up after Labs.    I spent approximately 30 minutes providing clinical care for this patient; including review of patient's chart and provider documentation, face to face time spent with patient in examination room (obtaining history, performing physical exam, discussing diagnosis and management options), placing orders, and completing patient documentation.     Evaristo Up MD  [facility]

## 2023-10-13 ENCOUNTER — HOSPITAL ENCOUNTER (OUTPATIENT)
Dept: RADIATION ONCOLOGY | Facility: HOSPITAL | Age: 73
Setting detail: RADIATION/ONCOLOGY SERIES
Discharge: HOME | End: 2023-10-13
Payer: MEDICARE

## 2023-10-13 ENCOUNTER — OFFICE VISIT (OUTPATIENT)
Dept: RADIATION ONCOLOGY | Facility: HOSPITAL | Age: 73
End: 2023-10-13
Payer: MEDICARE

## 2023-10-13 DIAGNOSIS — C61 PROSTATE CANCER: Primary | ICD-10-CM

## 2023-10-13 NOTE — PROGRESS NOTES
TELEPHONE NOTE  10/13/2023    I personally contacted Mr. Verma today to discuss his recovery following CyberKnife radiosurgery for prostate cancer as well as his recent PSA.  He is a 72-year-old gentleman with a history of a clinical T1c, Gene 3+4 = 7 adenocarcinoma of the prostate with a pretreatment PSA of 8.7.  He completed CyberKnife radiosurgery 7 months ago.  He is very pleased and states that this point he has no ongoing urinary symptoms.  His IPSS score is 2, significant for frequency less than 20% of the time and nocturia x1.  He denies any other associated symptoms.  He feels like he is returned back to normal and he has no complaints.      LABORATORY  PSA 8/30/2023: 1.51 NG/mL    ASSESSMENT/PLAN  Mr. Verma is a 72-year-old gentleman with a history of a favorable intermediate risk prostate cancer.  He is 7 months out from completion of therapy and clinically showing an excellent response with a nice downtrend in his PSA and no ongoing side effects.  I discussed with him the long-term survivorship model including the timeframe for repeat PSA testing which he is already scheduled to continue with Dr. Up.  I will plan to see him back in 1 year for telehealth visit.

## 2023-11-08 RX ORDER — BLOOD SUGAR DIAGNOSTIC
STRIP MISCELLANEOUS
Qty: 100 EACH | Refills: 5 | Status: SHIPPED | OUTPATIENT
Start: 2023-11-08

## 2023-12-27 ENCOUNTER — OFFICE VISIT (OUTPATIENT)
Dept: FAMILY MEDICINE CLINIC | Facility: CLINIC | Age: 73
End: 2023-12-27
Payer: MEDICARE

## 2023-12-27 VITALS
TEMPERATURE: 97.7 F | WEIGHT: 191 LBS | DIASTOLIC BLOOD PRESSURE: 82 MMHG | HEART RATE: 88 BPM | SYSTOLIC BLOOD PRESSURE: 128 MMHG | HEIGHT: 71 IN | OXYGEN SATURATION: 99 % | BODY MASS INDEX: 26.74 KG/M2

## 2023-12-27 DIAGNOSIS — J06.9 UPPER RESPIRATORY TRACT INFECTION, UNSPECIFIED TYPE: ICD-10-CM

## 2023-12-27 DIAGNOSIS — J02.9 SORE THROAT: Primary | ICD-10-CM

## 2023-12-27 PROCEDURE — 3044F HG A1C LEVEL LT 7.0%: CPT | Performed by: PHYSICIAN ASSISTANT

## 2023-12-27 PROCEDURE — 99213 OFFICE O/P EST LOW 20 MIN: CPT | Performed by: PHYSICIAN ASSISTANT

## 2023-12-27 NOTE — PROGRESS NOTES
"Chief Complaint  Sore Throat (Exposure to flu 12/25/23. Symptoms started this morning)    Subjective          Dakotah Verma presents to [facility]  History of Present Illness  Patient in today for evaluation on sore throat that started this am. Denies fever. Had mild cough that started this am. Denies nausea, vomiting or diarrhea. Was exposed to flu 2 days ago.   Sore Throat   This is a new problem. The current episode started today. There has been no fever. Associated symptoms include coughing. Pertinent negatives include no abdominal pain, congestion, diarrhea, ear pain, headaches, shortness of breath or vomiting.       Objective   Vital Signs:   /82   Pulse 88   Temp 97.7 °F (36.5 °C) (Infrared)   Ht 180.3 cm (71\")   Wt 86.6 kg (191 lb)   SpO2 99%   BMI 26.64 kg/m²     Body mass index is 26.64 kg/m².    Review of Systems   Constitutional:  Negative for fever.   HENT:  Positive for sore throat. Negative for congestion and ear pain.    Respiratory:  Positive for cough. Negative for shortness of breath and wheezing.    Cardiovascular:  Negative for chest pain and palpitations.   Gastrointestinal:  Negative for abdominal pain, diarrhea, nausea and vomiting.   Neurological:  Negative for dizziness and headache.       Past History:  Medical History: has a past medical history of Colon polyp, Diverticulosis, DM (diabetes mellitus), Fatty liver, GERD (gastroesophageal reflux disease), GERD with esophagitis, Nonalcoholic fatty liver disease, Prostate cancer, Prostatitis, Sciatica, left side, Speech abnormality, Tonsillitis, and Vitamin D deficiency.   Surgical History: has a past surgical history that includes Adenoidectomy; Tonsillectomy; Lumbar discectomy (10/30/2014); Colonoscopy; Prostate biopsy (Bilateral, 11/04/2022); Cyberknife (03/13/2023); and Prostate surgery (11/05/2022).   Family History: family history includes Asthma in his sister; COPD in his father; Diabetes in " his sister, sister, and sister; Diabetes type II in his mother and sister; Hypertension in his mother and sister; Mental illness in his mother; Other in his sister; Stroke in his mother.   Social History: reports that he has never smoked. He has never used smokeless tobacco. He reports that he does not drink alcohol and does not use drugs.      Current Outpatient Medications:     Accu-Chek Softclix Lancets lancets, TEST EVERY DAY OR AS DIRECTED FOR DIABETES, Disp: 100 each, Rfl: 2    clotrimazole-betamethasone (LOTRISONE) 1-0.05 % cream, Apply 1 application  topically to the appropriate area as directed 2 (Two) Times a Day., Disp: 45 g, Rfl: 3    glucose blood (Accu-Chek Leah Plus) test strip, TEST ONCE DAILY, Disp: 100 each, Rfl: 5    lisinopril (PRINIVIL,ZESTRIL) 5 MG tablet, Take 1 tablet by mouth Daily., Disp: 90 tablet, Rfl: 3    metFORMIN ER (GLUCOPHAGE-XR) 500 MG 24 hr tablet, Take 1 tablet twice a day with meals, Disp: 180 tablet, Rfl: 1    omeprazole (priLOSEC) 20 MG capsule, Take 1 capsule by mouth Daily., Disp: 90 capsule, Rfl: 3    vitamin B-12 (CYANOCOBALAMIN) 1000 MCG tablet, Take 1 tablet by mouth Daily., Disp: , Rfl:   Allergies: Sulfa antibiotics    Physical Exam  Constitutional:       Appearance: Normal appearance.   HENT:      Right Ear: Tympanic membrane normal.      Left Ear: Tympanic membrane normal.      Mouth/Throat:      Pharynx: Oropharynx is clear.   Eyes:      Conjunctiva/sclera: Conjunctivae normal.      Pupils: Pupils are equal, round, and reactive to light.   Cardiovascular:      Rate and Rhythm: Normal rate and regular rhythm.      Heart sounds: Normal heart sounds.   Pulmonary:      Effort: Pulmonary effort is normal.      Breath sounds: Normal breath sounds.   Abdominal:      Palpations: Abdomen is soft.      Tenderness: There is no abdominal tenderness.   Neurological:      Mental Status: He is oriented to person, place, and time.   Psychiatric:         Mood and Affect: Mood  normal.         Behavior: Behavior normal.             Assessment and Plan   Diagnoses and all orders for this visit:    1. Sore throat (Primary)  Rapid strep, flu and covid testing were negative; will send culture; recommend symptom management along with good hydration and rest; rtc if not improving   2. Upper respiratory tract infection, unspecified type  He had exposure to flu but states had issues with stomach upset often and prefers not to take tamiflu-- monitor symptoms closely and rtc or to ER for any progression of symptoms if needed           Follow Up   No follow-ups on file.  Patient was given instructions and counseling regarding his condition or for health maintenance advice. Please see specific information pulled into the AVS if appropriate.     Nancy Coon PA-C

## 2023-12-28 RX ORDER — METFORMIN HYDROCHLORIDE 500 MG/1
TABLET, EXTENDED RELEASE ORAL
Qty: 60 TABLET | Refills: 3 | Status: SHIPPED | OUTPATIENT
Start: 2023-12-28

## 2024-01-11 ENCOUNTER — OFFICE VISIT (OUTPATIENT)
Dept: FAMILY MEDICINE CLINIC | Facility: CLINIC | Age: 74
End: 2024-01-11
Payer: MEDICARE

## 2024-01-11 VITALS
BODY MASS INDEX: 25.62 KG/M2 | WEIGHT: 183 LBS | DIASTOLIC BLOOD PRESSURE: 70 MMHG | OXYGEN SATURATION: 98 % | HEART RATE: 100 BPM | HEIGHT: 71 IN | SYSTOLIC BLOOD PRESSURE: 130 MMHG

## 2024-01-11 DIAGNOSIS — R60.1 GENERALIZED EDEMA: Primary | ICD-10-CM

## 2024-01-11 PROCEDURE — 93000 ELECTROCARDIOGRAM COMPLETE: CPT | Performed by: FAMILY MEDICINE

## 2024-01-11 PROCEDURE — 99214 OFFICE O/P EST MOD 30 MIN: CPT | Performed by: FAMILY MEDICINE

## 2024-01-11 RX ORDER — HYDROCHLOROTHIAZIDE 25 MG/1
25 TABLET ORAL DAILY
Qty: 14 TABLET | Refills: 0 | Status: SHIPPED | OUTPATIENT
Start: 2024-01-11

## 2024-01-11 NOTE — PROGRESS NOTES
Follow Up Office Visit      Date of Visit:  2024   Patient Name: Dakotah Verma  : 1950   MRN: [id number]     Chief Complaint:    Chief Complaint   Patient presents with    Edema       History of Present Illness: Dakotah Verma is a 73 y.o. male who is here today for follow up.  Patient presents today for lower extremity edema bilaterally.  Patient diagnosed with URI around [place].  Has been exposed to flu.  Possibly came in too early to check to early to diagnose positive.  Does seem to have recovered mostly but still has some fatigue.  Patient does have some substantial bilateral lower extremity edema that has not had previously.  No shortness of breath.  No fever.        Subjective      Review of Systems:   Review of Systems   Constitutional:  Negative for fatigue and fever.   HENT:  Negative for congestion and ear pain.    Respiratory:  Negative for apnea, cough, chest tightness and shortness of breath.    Cardiovascular:  Positive for leg swelling. Negative for chest pain.   Gastrointestinal:  Negative for abdominal pain, constipation, diarrhea and nausea.   Musculoskeletal:  Negative for arthralgias.   Psychiatric/Behavioral:  Negative for depressed mood and stress.        Past Medical History:   Past Medical History:   Diagnosis Date    Colon polyp     have history of    Diverticulosis     have history of    DM (diabetes mellitus)     Fatty liver     GERD (gastroesophageal reflux disease)     taking med for    GERD with esophagitis     Nonalcoholic fatty liver disease     Prostate cancer     Prostatitis     Sciatica, left side     Speech abnormality     Pt has stuttered as a child, sometimes  has some trouble getting words out.    Tonsillitis     Vitamin D deficiency        Past Surgical History:   Past Surgical History:   Procedure Laterality Date    ADENOIDECTOMY      COLONOSCOPY      2016    CYBERKNIFE  2023    Prostate/SV    LUMBAR DISCECTOMY  10/30/2014    PROSTATE BIOPSY  Bilateral 11/04/2022    Procedure: PROSTATE ULTRASOUND BIOPSY MRI FUSION WITH URONAV;  Surgeon: Evaristo Up MD;  Location: [facility];  Service: Urology;  Laterality: Bilateral;    PROSTATE SURGERY  11/05/2022    Biopsy    TONSILLECTOMY         Family History:   Family History   Problem Relation Age of Onset    Diabetes type II Mother     Hypertension Mother         PULMONARY    Stroke Mother     Mental illness Mother     COPD Father     Diabetes type II Sister     Asthma Sister     Other Sister         uterine cancer    Diabetes Sister     Diabetes Sister     Diabetes Sister     Hypertension Sister        Social History:   Social History     Socioeconomic History    Marital status:    Tobacco Use    Smoking status: Never    Smokeless tobacco: Never    Tobacco comments:     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   Vaping Use    Vaping Use: Never used   Substance and Sexual Activity    Alcohol use: Never    Drug use: Never    Sexual activity: Not Currently     Partners: Female     Comment:  and not sexually active.       Medications:     Current Outpatient Medications:     Accu-Chek Softclix Lancets lancets, TEST EVERY DAY OR AS DIRECTED FOR DIABETES, Disp: 100 each, Rfl: 2    clotrimazole-betamethasone (LOTRISONE) 1-0.05 % cream, Apply 1 application  topically to the appropriate area as directed 2 (Two) Times a Day., Disp: 45 g, Rfl: 3    glucose blood (Accu-Chek Leah Plus) test strip, TEST ONCE DAILY, Disp: 100 each, Rfl: 5    hydroCHLOROthiazide (HYDRODIURIL) 25 MG tablet, Take 1 tablet by mouth Daily., Disp: 14 tablet, Rfl: 0    lisinopril (PRINIVIL,ZESTRIL) 5 MG tablet, Take 1 tablet by mouth Daily., Disp: 90 tablet, Rfl: 3    metFORMIN ER (GLUCOPHAGE-XR) 500 MG 24 hr tablet, TAKE 1 TABLET TWICE A DAY WITH MEALS, Disp: 60 tablet, Rfl: 3    omeprazole (priLOSEC) 20 MG capsule, Take 1 capsule by mouth Daily., Disp: 90 capsule, Rfl: 3    vitamin B-12 (CYANOCOBALAMIN) 1000 MCG  "tablet, Take 1 tablet by mouth Daily., Disp: , Rfl:     Allergies:   Allergies   Allergen Reactions    Sulfa Antibiotics Hives and Itching       Objective     Physical Exam:  Vital Signs:   Vitals:    01/11/24 1133   BP: 130/70   Pulse: 100   SpO2: 98%   Weight: 83 kg (183 lb)   Height: 180.3 cm (71\")     Body mass index is 25.52 kg/m².     Physical Exam  Vitals and nursing note reviewed.   Constitutional:       General: He is not in acute distress.     Appearance: Normal appearance. He is not ill-appearing.   HENT:      Head: Normocephalic and atraumatic.      Right Ear: Tympanic membrane and ear canal normal.      Left Ear: Tympanic membrane and ear canal normal.      Nose: Nose normal.   Cardiovascular:      Rate and Rhythm: Normal rate and regular rhythm.      Heart sounds: Normal heart sounds.   Pulmonary:      Effort: Pulmonary effort is normal.      Breath sounds: Normal breath sounds.   Musculoskeletal:      Right lower leg: Edema present.      Left lower leg: Edema present.   Neurological:      Mental Status: He is alert and oriented to person, place, and time. Mental status is at baseline.   Psychiatric:         Mood and Affect: Mood normal.           ECG 12 Lead    Date/Time: 1/11/2024 12:54 PM  Performed by: Justin Spence MD    Authorized by: Justin Spence MD  Rhythm: sinus rhythm  Rate: normal  Conduction: left anterior fascicular block  ST Segments: ST segments normal  QRS axis: normal    Clinical impression: normal ECG            Assessment / Plan      Assessment/Plan:   Diagnoses and all orders for this visit:    1. Generalized edema (Primary)  -     CBC Auto Differential  -     Comprehensive Metabolic Panel    Other orders  -     hydroCHLOROthiazide (HYDRODIURIL) 25 MG tablet; Take 1 tablet by mouth Daily.  Dispense: 14 tablet; Refill: 0  -     ECG 12 Lead         EKG was normal.  I feel he most likely just has a postviral syndrome.  I am going to check a CBC and a CMP.  Gave 2 weeks of " diuretic.  Suspect this will self resolve soon.    Follow Up:   No follow-ups on file.    Justin Spence  [facility]

## 2024-01-12 LAB
ALBUMIN SERPL-MCNC: 4.2 G/DL (ref 3.8–4.8)
ALBUMIN/GLOB SERPL: 1.7 {RATIO} (ref 1.2–2.2)
ALP SERPL-CCNC: 84 IU/L (ref 44–121)
ALT SERPL-CCNC: 35 IU/L (ref 0–44)
AST SERPL-CCNC: 33 IU/L (ref 0–40)
BASOPHILS # BLD AUTO: 0.1 X10E3/UL (ref 0–0.2)
BASOPHILS NFR BLD AUTO: 2 %
BILIRUB SERPL-MCNC: 1.3 MG/DL (ref 0–1.2)
BUN SERPL-MCNC: 10 MG/DL (ref 8–27)
BUN/CREAT SERPL: 10 (ref 10–24)
CALCIUM SERPL-MCNC: 9.4 MG/DL (ref 8.6–10.2)
CHLORIDE SERPL-SCNC: 99 MMOL/L (ref 96–106)
CO2 SERPL-SCNC: 25 MMOL/L (ref 20–29)
CREAT SERPL-MCNC: 0.96 MG/DL (ref 0.76–1.27)
EGFRCR SERPLBLD CKD-EPI 2021: 85 ML/MIN/1.73
EOSINOPHIL # BLD AUTO: 0.1 X10E3/UL (ref 0–0.4)
EOSINOPHIL NFR BLD AUTO: 1 %
ERYTHROCYTE [DISTWIDTH] IN BLOOD BY AUTOMATED COUNT: 12.8 % (ref 11.6–15.4)
GLOBULIN SER CALC-MCNC: 2.5 G/DL (ref 1.5–4.5)
GLUCOSE SERPL-MCNC: 176 MG/DL (ref 70–99)
HCT VFR BLD AUTO: 38.6 % (ref 37.5–51)
HGB BLD-MCNC: 13.3 G/DL (ref 13–17.7)
IMM GRANULOCYTES # BLD AUTO: 0 X10E3/UL (ref 0–0.1)
IMM GRANULOCYTES NFR BLD AUTO: 0 %
LYMPHOCYTES # BLD AUTO: 1.1 X10E3/UL (ref 0.7–3.1)
LYMPHOCYTES NFR BLD AUTO: 16 %
MCH RBC QN AUTO: 31.4 PG (ref 26.6–33)
MCHC RBC AUTO-ENTMCNC: 34.5 G/DL (ref 31.5–35.7)
MCV RBC AUTO: 91 FL (ref 79–97)
MONOCYTES # BLD AUTO: 0.5 X10E3/UL (ref 0.1–0.9)
MONOCYTES NFR BLD AUTO: 8 %
NEUTROPHILS # BLD AUTO: 4.9 X10E3/UL (ref 1.4–7)
NEUTROPHILS NFR BLD AUTO: 73 %
PLATELET # BLD AUTO: 422 X10E3/UL (ref 150–450)
POTASSIUM SERPL-SCNC: 3.4 MMOL/L (ref 3.5–5.2)
PROT SERPL-MCNC: 6.7 G/DL (ref 6–8.5)
RBC # BLD AUTO: 4.23 X10E6/UL (ref 4.14–5.8)
SODIUM SERPL-SCNC: 141 MMOL/L (ref 134–144)
WBC # BLD AUTO: 6.8 X10E3/UL (ref 3.4–10.8)

## 2024-01-15 ENCOUNTER — TELEPHONE (OUTPATIENT)
Dept: FAMILY MEDICINE CLINIC | Facility: CLINIC | Age: 74
End: 2024-01-15

## 2024-01-16 ENCOUNTER — TELEPHONE (OUTPATIENT)
Dept: FAMILY MEDICINE CLINIC | Facility: CLINIC | Age: 74
End: 2024-01-16

## 2024-01-23 ENCOUNTER — OFFICE VISIT (OUTPATIENT)
Dept: FAMILY MEDICINE CLINIC | Facility: CLINIC | Age: 74
End: 2024-01-23
Payer: MEDICARE

## 2024-01-23 VITALS
BODY MASS INDEX: 25.62 KG/M2 | SYSTOLIC BLOOD PRESSURE: 126 MMHG | HEIGHT: 71 IN | WEIGHT: 183 LBS | OXYGEN SATURATION: 99 % | HEART RATE: 105 BPM | DIASTOLIC BLOOD PRESSURE: 70 MMHG

## 2024-01-23 DIAGNOSIS — R60.9 EDEMA, UNSPECIFIED TYPE: Primary | ICD-10-CM

## 2024-01-23 DIAGNOSIS — E11.9 TYPE 2 DIABETES MELLITUS WITHOUT COMPLICATION, WITHOUT LONG-TERM CURRENT USE OF INSULIN: ICD-10-CM

## 2024-01-23 DIAGNOSIS — E87.6 POTASSIUM (K) DEFICIENCY: ICD-10-CM

## 2024-01-23 DIAGNOSIS — K21.00 GASTROESOPHAGEAL REFLUX DISEASE WITH ESOPHAGITIS WITHOUT HEMORRHAGE: ICD-10-CM

## 2024-01-23 LAB
BILIRUB BLD-MCNC: NEGATIVE MG/DL
CLARITY, POC: CLEAR
COLOR UR: YELLOW
EXPIRATION DATE: ABNORMAL
EXPIRATION DATE: NORMAL
GLUCOSE UR STRIP-MCNC: NEGATIVE MG/DL
KETONES UR QL: NEGATIVE
LEUKOCYTE EST, POC: NEGATIVE
Lab: ABNORMAL
Lab: NORMAL
NITRITE UR-MCNC: NEGATIVE MG/ML
PH UR: 6.5 [PH] (ref 5–8)
POC CREATININE URINE: 200
POC MICROALBUMIN URINE: 10
PROT UR STRIP-MCNC: NEGATIVE MG/DL
RBC # UR STRIP: ABNORMAL /UL
SP GR UR: 1.02 (ref 1–1.03)
UROBILINOGEN UR QL: ABNORMAL

## 2024-01-23 RX ORDER — GABAPENTIN 100 MG/1
CAPSULE ORAL
Qty: 90 CAPSULE | Refills: 1 | Status: SHIPPED | OUTPATIENT
Start: 2024-01-23

## 2024-01-23 RX ORDER — POTASSIUM CHLORIDE 750 MG/1
10 TABLET, FILM COATED, EXTENDED RELEASE ORAL 2 TIMES DAILY
Qty: 30 TABLET | Refills: 0 | Status: SHIPPED | OUTPATIENT
Start: 2024-01-23

## 2024-01-23 RX ORDER — FUROSEMIDE 40 MG/1
40 TABLET ORAL 2 TIMES DAILY
Qty: 15 TABLET | Refills: 0 | Status: SHIPPED | OUTPATIENT
Start: 2024-01-23

## 2024-01-23 NOTE — PROGRESS NOTES
Office Note     Name: Dakotah Verma    : 1950     MRN: [id number]     Chief Complaint  Foot Swelling (Both feet. 3 weeks/)    Subjective     History of Present Illness:  Dakotah Verma is a 73 y.o. male who presents today for swelling his feet not tested 1 by HCTZ 25 mg.  He is sleeping in recliner x 2 weeks since he had flu syndrome.  But he is got over that.  Got to get in the bed gabapentin we will talk.  His sister takes 1000 mg of gabapentin twice daily    Review of Systems:   Review of Systems    Past Medical History:   Past Medical History:   Diagnosis Date    Colon polyp     have history of    Diverticulosis     have history of    DM (diabetes mellitus)     Fatty liver     GERD (gastroesophageal reflux disease)     taking med for    GERD with esophagitis     Nonalcoholic fatty liver disease     Prostate cancer     Prostatitis     Sciatica, left side     Speech abnormality     Pt has stuttered as a child, sometimes  has some trouble getting words out.    Tonsillitis     Vitamin D deficiency        Past Surgical History:   Past Surgical History:   Procedure Laterality Date    ADENOIDECTOMY      COLONOSCOPY      2016    CYBERKNIFE  2023    Prostate/SV    LUMBAR DISCECTOMY  10/30/2014    PROSTATE BIOPSY Bilateral 2022    Procedure: PROSTATE ULTRASOUND BIOPSY MRI FUSION WITH URONAV;  Surgeon: Evaristo Up MD;  Location[facility];  Service: Urology;  Laterality: Bilateral;    PROSTATE SURGERY  2022    Biopsy    TONSILLECTOMY         Family History:   Family History   Problem Relation Age of Onset    Diabetes type II Mother     Hypertension Mother         PULMONARY    Stroke Mother     Mental illness Mother     COPD Father     Diabetes type II Sister     Asthma Sister     Other Sister         uterine cancer    Diabetes Sister     Diabetes Sister     Diabetes Sister     Hypertension Sister        Social History:   Social History     Socioeconomic History    Marital  status:    Tobacco Use    Smoking status: Never     Passive exposure: Never    Smokeless tobacco: Never    Tobacco comments:     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   Vaping Use    Vaping Use: Never used   Substance and Sexual Activity    Alcohol use: Never    Drug use: Never    Sexual activity: Not Currently     Partners: Female     Comment:  and not sexually active.       Immunizations:   Immunization History   Administered Date(s) Administered    COVID-19 (MODERNA) 1st,2nd,3rd Dose Monovalent 03/23/2021, 04/20/2021, 11/19/2021    Fluzone High-Dose 65+yrs 08/21/2020, 10/05/2021, 10/23/2022    Fluzone Quad >6mos (Multi-dose) 08/16/2019    Hepatitis A 08/16/2019, 02/11/2020    Hepatitis B 2 Dose Vaccine Heplisav-B 07/19/2022, 08/19/2022    Hepatitis B Adult/Adolescent IM 04/01/1997, 05/01/1997    Influenza, Unspecified 08/21/2020    Pneumococcal Conjugate 13-Valent (PCV13) 06/06/2017    Pneumococcal, Unspecified 03/28/2013    Shingrix 08/21/2020, 11/20/2020    Tdap 08/16/2019    Zoster, Unspecified 04/16/2017, 08/21/2020, 11/20/2020        Medications:     Current Outpatient Medications:     Accu-Chek Softclix Lancets lancets, TEST EVERY DAY OR AS DIRECTED FOR DIABETES, Disp: 100 each, Rfl: 2    clotrimazole-betamethasone (LOTRISONE) 1-0.05 % cream, Apply 1 application  topically to the appropriate area as directed 2 (Two) Times a Day., Disp: 45 g, Rfl: 3    glucose blood (Accu-Chek Leah Plus) test strip, TEST ONCE DAILY, Disp: 100 each, Rfl: 5    lisinopril (PRINIVIL,ZESTRIL) 5 MG tablet, Take 1 tablet by mouth Daily., Disp: 90 tablet, Rfl: 3    metFORMIN ER (GLUCOPHAGE-XR) 500 MG 24 hr tablet, TAKE 1 TABLET TWICE A DAY WITH MEALS, Disp: 60 tablet, Rfl: 3    omeprazole (priLOSEC) 20 MG capsule, Take 1 capsule by mouth Daily., Disp: 90 capsule, Rfl: 3    vitamin B-12 (CYANOCOBALAMIN) 1000 MCG tablet, Take 1 tablet by mouth Daily., Disp: , Rfl:     furosemide (Lasix) 40 MG tablet, Take  "1 tablet by mouth 2 (Two) Times a Day., Disp: 15 tablet, Rfl: 0    gabapentin (NEURONTIN) 100 MG capsule, 1 to 3 at night, Disp: 90 capsule, Rfl: 1    potassium chloride 10 MEQ CR tablet, Take 1 tablet by mouth 2 (Two) Times a Day., Disp: 30 tablet, Rfl: 0    Allergies:   Allergies   Allergen Reactions    Sulfa Antibiotics Hives and Itching       Objective     Vital Signs  /70   Pulse 105   Ht 180.3 cm (71\")   Wt 83 kg (183 lb)   SpO2 99%   BMI 25.52 kg/m²   Estimated body mass index is 25.52 kg/m² as calculated from the following:    Height as of this encounter: 180.3 cm (71\").    Weight as of this encounter: 83 kg (183 lb).            Physical Exam  Vitals and nursing note reviewed.   Constitutional:       Appearance: Normal appearance. He is normal weight.   HENT:      Head: Normocephalic.      Right Ear: External ear normal.      Left Ear: External ear normal.      Nose: Nose normal.   Eyes:      Pupils: Pupils are equal, round, and reactive to light.   Neck:      Vascular: No carotid bruit.   Cardiovascular:      Rate and Rhythm: Normal rate and regular rhythm.      Pulses: Normal pulses.      Heart sounds: Normal heart sounds.   Pulmonary:      Effort: Pulmonary effort is normal.      Breath sounds: Normal breath sounds.   Musculoskeletal:         General: Tenderness present.      Cervical back: Normal range of motion and neck supple.      Right lower leg: Edema present.      Left lower leg: Edema present.      Comments: 3+ on the left lower remedy edema.  2+ on the right pedal edema.  The varicosities look 1+ normal.  He is not lying in bed   Skin:     General: Skin is warm and dry.   Neurological:      Mental Status: He is alert.   Psychiatric:         Mood and Affect: Mood normal.          Procedures     Assessment and Plan     1. Edema, unspecified type  Lasix 20 mg potassium 20 mill equivalents and if not done drawl blood    2. Type 2 diabetes mellitus without complication, without long-term " current use of insulin      3. Gastroesophageal reflux disease with esophagitis without hemorrhage      4. Potassium (K) deficiency  3.4       Follow Up  Return in about 2 weeks (around 2/6/2024).    David BLACKMON [facility]  [address]  [facility] [id number] [phone]  Answers submitted by the patient for this visit:  Primary Reason for Visit (Submitted on 1/21/2024)  What is the primary reason for your visit?: Other  Other (Submitted on 1/21/2024)  Please describe your symptoms.: swollen feet and ankles  Have you had these symptoms before?: No  How long have you been having these symptoms?: 1-2 weeks  Please list any medications you are currently taking for this condition.: hydrochlorothiazide 25 mg tablets  Please describe any probable cause for these symptoms. : had the Flu prior to feet and ankles swelling . water pill not working... been taking since the 12th of January 2024.

## 2024-02-02 RX ORDER — LANCETS
EACH MISCELLANEOUS
Qty: 100 EACH | Refills: 2 | Status: SHIPPED | OUTPATIENT
Start: 2024-02-02 | End: 2024-02-05 | Stop reason: SDUPTHER

## 2024-02-05 ENCOUNTER — OFFICE VISIT (OUTPATIENT)
Dept: FAMILY MEDICINE CLINIC | Facility: CLINIC | Age: 74
End: 2024-02-05
Payer: MEDICARE

## 2024-02-05 VITALS
DIASTOLIC BLOOD PRESSURE: 72 MMHG | SYSTOLIC BLOOD PRESSURE: 126 MMHG | HEIGHT: 71 IN | HEART RATE: 79 BPM | BODY MASS INDEX: 25.62 KG/M2 | OXYGEN SATURATION: 100 % | WEIGHT: 183 LBS

## 2024-02-05 DIAGNOSIS — I10 PRIMARY HYPERTENSION: ICD-10-CM

## 2024-02-05 DIAGNOSIS — E87.6 HYPOKALEMIA: Primary | ICD-10-CM

## 2024-02-05 DIAGNOSIS — E11.9 TYPE 2 DIABETES MELLITUS WITHOUT COMPLICATION, WITHOUT LONG-TERM CURRENT USE OF INSULIN: ICD-10-CM

## 2024-02-05 PROCEDURE — 1160F RVW MEDS BY RX/DR IN RCRD: CPT | Performed by: FAMILY MEDICINE

## 2024-02-05 PROCEDURE — 99214 OFFICE O/P EST MOD 30 MIN: CPT | Performed by: FAMILY MEDICINE

## 2024-02-05 PROCEDURE — 1159F MED LIST DOCD IN RCRD: CPT | Performed by: FAMILY MEDICINE

## 2024-02-05 RX ORDER — LANCETS
1 EACH MISCELLANEOUS DAILY
COMMUNITY
End: 2024-02-05 | Stop reason: SDUPTHER

## 2024-02-05 RX ORDER — LANCETS
1 EACH MISCELLANEOUS DAILY
Qty: 100 EACH | Refills: 1 | Status: SHIPPED | OUTPATIENT
Start: 2024-02-05

## 2024-02-05 NOTE — PROGRESS NOTES
Office Note     Name: Dakotah Verma    : 1950     MRN: [id number]     Chief Complaint  Edema (Follow up)    Subjective     History of Present Illness:  Dakotah Verma is a 73 y.o. male who presents today for for follow-up of edema.  He took the Lasix pills and he is off of them but he got constipated and hemorrhage to his right orbit    Review of Systems:   Review of Systems    Past Medical History:   Past Medical History:   Diagnosis Date    Colon polyp     have history of    Diverticulosis     have history of    DM (diabetes mellitus)     Fatty liver     GERD (gastroesophageal reflux disease)     taking med for    GERD with esophagitis     Nonalcoholic fatty liver disease     Prostate cancer     Prostatitis     Sciatica, left side     Speech abnormality     Pt has stuttered as a child, sometimes  has some trouble getting words out.    Tonsillitis     Vitamin D deficiency        Past Surgical History:   Past Surgical History:   Procedure Laterality Date    ADENOIDECTOMY      COLONOSCOPY      2016    CYBERKNIFE  2023    Prostate/SV    LUMBAR DISCECTOMY  10/30/2014    PROSTATE BIOPSY Bilateral 2022    Procedure: PROSTATE ULTRASOUND BIOPSY MRI FUSION WITH URONAV;  Surgeon: Evaristo Up MD;  Location: [facility];  Service: Urology;  Laterality: Bilateral;    PROSTATE SURGERY  2022    Biopsy    TONSILLECTOMY         Family History:   Family History   Problem Relation Age of Onset    Diabetes type II Mother     Hypertension Mother         PULMONARY    Stroke Mother     Mental illness Mother     COPD Father     Diabetes type II Sister     Asthma Sister     Other Sister         uterine cancer    Diabetes Sister     Diabetes Sister     Diabetes Sister     Hypertension Sister        Social History:   Social History     Socioeconomic History    Marital status:    Tobacco Use    Smoking status: Never     Passive exposure: Never    Smokeless tobacco: Never    Tobacco comments:      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   Vaping Use    Vaping Use: Never used   Substance and Sexual Activity    Alcohol use: Never    Drug use: Never    Sexual activity: Not Currently     Partners: Female     Comment:  and not sexually active.       Immunizations:   Immunization History   Administered Date(s) Administered    COVID-19 (MODERNA) 1st,2nd,3rd Dose Monovalent 03/23/2021, 04/20/2021, 11/19/2021    Fluzone High-Dose 65+yrs 08/21/2020, 10/05/2021, 10/23/2022    Fluzone Quad >6mos (Multi-dose) 08/16/2019    Hepatitis A 08/16/2019, 02/11/2020    Hepatitis B 2 Dose Vaccine Heplisav-B 07/19/2022, 08/19/2022    Hepatitis B Adult/Adolescent IM 04/01/1997, 05/01/1997    Influenza, Unspecified 08/21/2020    Pneumococcal Conjugate 13-Valent (PCV13) 06/06/2017    Pneumococcal, Unspecified 03/28/2013    Shingrix 08/21/2020, 11/20/2020    Tdap 08/16/2019    Zoster, Unspecified 04/16/2017, 08/21/2020, 11/20/2020        Medications:     Current Outpatient Medications:     Accu-Chek Softclix Lancets lancets, 1 each by Other route Daily. Once a day. In the morning, Disp: 100 each, Rfl: 1    clotrimazole-betamethasone (LOTRISONE) 1-0.05 % cream, Apply 1 application  topically to the appropriate area as directed 2 (Two) Times a Day., Disp: 45 g, Rfl: 3    gabapentin (NEURONTIN) 100 MG capsule, 1 to 3 at night, Disp: 90 capsule, Rfl: 1    glucose blood (Accu-Chek Leah Plus) test strip, TEST ONCE DAILY, Disp: 100 each, Rfl: 5    lisinopril (PRINIVIL,ZESTRIL) 5 MG tablet, Take 1 tablet by mouth Daily., Disp: 90 tablet, Rfl: 3    metFORMIN ER (GLUCOPHAGE-XR) 500 MG 24 hr tablet, TAKE 1 TABLET TWICE A DAY WITH MEALS, Disp: 60 tablet, Rfl: 3    omeprazole (priLOSEC) 20 MG capsule, Take 1 capsule by mouth Daily., Disp: 90 capsule, Rfl: 3    potassium chloride 10 MEQ CR tablet, Take 1 tablet by mouth 2 (Two) Times a Day., Disp: 30 tablet, Rfl: 0    vitamin B-12 (CYANOCOBALAMIN) 1000 MCG tablet, Take 1  "tablet by mouth Daily., Disp: , Rfl:     Allergies:   Allergies   Allergen Reactions    Sulfa Antibiotics Hives and Itching       Objective     Vital Signs  /72   Pulse 79   Ht 180.3 cm (71\")   Wt 83 kg (183 lb)   SpO2 100%   BMI 25.52 kg/m²   Estimated body mass index is 25.52 kg/m² as calculated from the following:    Height as of this encounter: 180.3 cm (71\").    Weight as of this encounter: 83 kg (183 lb).            Physical Exam  Vitals and nursing note reviewed.   Constitutional:       Appearance: Normal appearance. He is normal weight.   HENT:      Head: Normocephalic.      Right Ear: External ear normal.      Left Ear: External ear normal.      Nose: Nose normal.   Eyes:      Pupils: Pupils are equal, round, and reactive to light.   Cardiovascular:      Rate and Rhythm: Normal rate and regular rhythm.      Pulses: Normal pulses.      Heart sounds: Normal heart sounds.   Musculoskeletal:         General: No swelling. Normal range of motion.      Cervical back: Normal range of motion and neck supple.      Right lower leg: No edema.      Left lower leg: No edema.   Skin:     General: Skin is warm and dry.   Neurological:      Mental Status: He is alert.   Psychiatric:         Mood and Affect: Mood normal.      No edema, only dry skin    Procedures     Assessment and Plan     1. Hypokalemia  Make sure the magnesium looks okay  - Magnesium    2. Primary hypertension  Controlled  - Comprehensive Metabolic Panel    3. Type 2 diabetes mellitus without complication, without long-term current use of insulin  Controlled       Follow Up  Return if symptoms worsen or fail to improve.    David BLACKMON [facility]  [address] [place] [id number] [phone]  "

## 2024-02-06 LAB
ALBUMIN SERPL-MCNC: 4.4 G/DL (ref 3.8–4.8)
ALBUMIN/GLOB SERPL: 1.9 {RATIO} (ref 1.2–2.2)
ALP SERPL-CCNC: 73 IU/L (ref 44–121)
ALT SERPL-CCNC: 19 IU/L (ref 0–44)
AST SERPL-CCNC: 19 IU/L (ref 0–40)
BILIRUB SERPL-MCNC: 1 MG/DL (ref 0–1.2)
BUN SERPL-MCNC: 8 MG/DL (ref 8–27)
BUN/CREAT SERPL: 9 (ref 10–24)
CALCIUM SERPL-MCNC: 9.9 MG/DL (ref 8.6–10.2)
CHLORIDE SERPL-SCNC: 102 MMOL/L (ref 96–106)
CO2 SERPL-SCNC: 25 MMOL/L (ref 20–29)
CREAT SERPL-MCNC: 0.86 MG/DL (ref 0.76–1.27)
EGFRCR SERPLBLD CKD-EPI 2021: 93 ML/MIN/1.73
GLOBULIN SER CALC-MCNC: 2.3 G/DL (ref 1.5–4.5)
GLUCOSE SERPL-MCNC: 127 MG/DL (ref 70–99)
MAGNESIUM SERPL-MCNC: 1.7 MG/DL (ref 1.6–2.3)
POTASSIUM SERPL-SCNC: 4.8 MMOL/L (ref 3.5–5.2)
PROT SERPL-MCNC: 6.7 G/DL (ref 6–8.5)
SODIUM SERPL-SCNC: 141 MMOL/L (ref 134–144)

## 2024-02-13 RX ORDER — LISINOPRIL 5 MG/1
5 TABLET ORAL DAILY
Qty: 90 TABLET | Refills: 3 | Status: SHIPPED | OUTPATIENT
Start: 2024-02-13

## 2024-02-13 RX ORDER — OMEPRAZOLE 20 MG/1
20 CAPSULE, DELAYED RELEASE ORAL DAILY
Qty: 90 CAPSULE | Refills: 3 | Status: SHIPPED | OUTPATIENT
Start: 2024-02-13

## 2024-03-05 ENCOUNTER — LAB (OUTPATIENT)
Dept: LAB | Facility: HOSPITAL | Age: 74
End: 2024-03-05
Payer: MEDICARE

## 2024-03-05 DIAGNOSIS — C61 MALIGNANT NEOPLASM OF PROSTATE: Primary | ICD-10-CM

## 2024-03-05 LAB — PSA SERPL-MCNC: 0.68 NG/ML (ref 0–4)

## 2024-03-05 PROCEDURE — 36415 COLL VENOUS BLD VENIPUNCTURE: CPT

## 2024-03-05 PROCEDURE — 84153 ASSAY OF PSA TOTAL: CPT

## 2024-03-11 ENCOUNTER — OFFICE VISIT (OUTPATIENT)
Dept: UROLOGY | Facility: CLINIC | Age: 74
End: 2024-03-11
Payer: MEDICARE

## 2024-03-11 VITALS
HEIGHT: 71 IN | BODY MASS INDEX: 26.02 KG/M2 | SYSTOLIC BLOOD PRESSURE: 136 MMHG | WEIGHT: 185.9 LBS | OXYGEN SATURATION: 99 % | HEART RATE: 82 BPM | DIASTOLIC BLOOD PRESSURE: 88 MMHG

## 2024-03-11 DIAGNOSIS — C61 PROSTATE CANCER: ICD-10-CM

## 2024-03-11 DIAGNOSIS — N48.9 PENILE LESION: Primary | ICD-10-CM

## 2024-03-11 PROCEDURE — 1160F RVW MEDS BY RX/DR IN RCRD: CPT | Performed by: STUDENT IN AN ORGANIZED HEALTH CARE EDUCATION/TRAINING PROGRAM

## 2024-03-11 PROCEDURE — 1159F MED LIST DOCD IN RCRD: CPT | Performed by: STUDENT IN AN ORGANIZED HEALTH CARE EDUCATION/TRAINING PROGRAM

## 2024-03-11 PROCEDURE — 99214 OFFICE O/P EST MOD 30 MIN: CPT | Performed by: STUDENT IN AN ORGANIZED HEALTH CARE EDUCATION/TRAINING PROGRAM

## 2024-03-11 NOTE — PROGRESS NOTES
Follow Up Office Visit      Patient Name: Dakotah Verma  : 1950   MRN: [id number]     Chief Complaint:    Chief Complaint   Patient presents with    Prostate Cancer       Referring Provider: No ref. provider found    History of Present Illness: Dakotah Verma is a 73 y.o. male who presents today for follow up of prostate cancer and penile lesion.  Last saw him in clinic on 2023.  History of hypertension, type 2 diabetes, vitamin D deficiency, elevated PSA.  Diagnosed with grade group 2 prostate cancer, status post XRT, completed in 2023.    He reports doing well since we last saw him.  He has no urinary complaints today.  His IPSS is 1.  Denies any dysuria or gross hematuria.    In regards to the penile lesion on the right lateral shaft, he states that he did take the Lotrisone cream for about a month.  He thinks the lesion may have shrunk slightly, but did not notice any significant change.  Again he states is been here for least a couple of years.  It sometimes itches.  It is mildly tender at times, but mostly nonpainful.  He denies any bleeding or weeping from the lesion.  States that it has remained the same size for some time now.    Lab Results   Component Value Date    PSA 0.684 2024    PSA 1.510 2023    PSA 8.7 (H) 2022         Subjective      Review of System: Review of Systems   Respiratory: Negative.     Cardiovascular: Negative.    Genitourinary: Negative.  Negative for decreased urine volume, difficulty urinating, dysuria, enuresis, flank pain, frequency, hematuria and urgency.      I have reviewed the ROS documented by my clinical staff, I have updated appropriately and I agree. Ollie Magallanes PA-C    I have reviewed and the following portions of the patient's history were updated as appropriate: past family history, past medical history, past social history, past surgical history and problem list.    Medications:     Current Outpatient Medications:      Accu-Chek Softclix Lancets lancets, 1 each by Other route Daily. Once a day. In the morning, Disp: 100 each, Rfl: 1    clotrimazole-betamethasone (LOTRISONE) 1-0.05 % cream, Apply 1 application  topically to the appropriate area as directed 2 (Two) Times a Day., Disp: 45 g, Rfl: 3    gabapentin (NEURONTIN) 100 MG capsule, 1 to 3 at night, Disp: 90 capsule, Rfl: 1    glucose blood (Accu-Chek Leah Plus) test strip, TEST ONCE DAILY, Disp: 100 each, Rfl: 5    lisinopril (PRINIVIL,ZESTRIL) 5 MG tablet, TAKE 1 TABLET EVERY DAY, Disp: 90 tablet, Rfl: 3    metFORMIN ER (GLUCOPHAGE-XR) 500 MG 24 hr tablet, TAKE 1 TABLET TWICE A DAY WITH MEALS, Disp: 60 tablet, Rfl: 3    omeprazole (priLOSEC) 20 MG capsule, TAKE 1 CAPSULE EVERY DAY, Disp: 90 capsule, Rfl: 3    potassium chloride 10 MEQ CR tablet, Take 1 tablet by mouth 2 (Two) Times a Day., Disp: 30 tablet, Rfl: 0    vitamin B-12 (CYANOCOBALAMIN) 1000 MCG tablet, Take 1 tablet by mouth Daily., Disp: , Rfl:     Allergies:   Allergies   Allergen Reactions    Sulfa Antibiotics Hives and Itching       IPSS Questionnaire (AUA-7):  Over the past month…    1)  Incomplete Emptying:       How often have you had a sensation of not emptying you had the sensation of not emptying your bladder completely after you finished urinating?  0 - Not at all   2)  Frequency:       How often have you had the urinate again less than two hours after you finished urinating?  0 - Not at all   3)  Intermittency:       How often have you found you stopped and started again several times when you urinated?   0 - Not at all   4) Urgency:      How often have you found it difficult to postpone urination?  0 - Not at all   5) Weak Stream:      How often have you had a weak urinary stream?  0 - Not at all   6) Straining:       How often have you had to push or strain to begin urination?  0 - Not at all   7) Nocturia:      How many times did you most typically get up to urinate from the time you went to bed at  "night until the time you got up in the morning?  1 - 1 time   Total Score:  1   The International Prostate Symptom Score (IPSS) is used to screen, diagnose, track symptoms of benign prostatic hyperplasia (BPH).   0-7 (Mild Symptoms) 8-19 (Moderate) 20-35 (Severe)   Quality of Life (QoL):  If you were to spend the rest of your life with your urinary condition just the way it is now, how would you feel about that? 0-Delighted   Urine Leakage (Incontinence) 0-No Leakage       Objective     Physical Exam:   Vital Signs:   Vitals:    03/11/24 1411   BP: 136/88   Pulse: 82   SpO2: 99%   Weight: 84.3 kg (185 lb 14.4 oz)   Height: 180.3 cm (71\")     Body mass index is 25.93 kg/m².     Physical Exam  Vitals and nursing note reviewed.   Constitutional:       Appearance: Normal appearance.   HENT:      Head: Normocephalic and atraumatic.      Mouth/Throat:      Mouth: Mucous membranes are moist.      Pharynx: Oropharynx is clear.   Eyes:      Extraocular Movements: Extraocular movements intact.      Conjunctiva/sclera: Conjunctivae normal.   Cardiovascular:      Rate and Rhythm: Normal rate and regular rhythm.   Pulmonary:      Effort: Pulmonary effort is normal. No respiratory distress.   Abdominal:      Palpations: Abdomen is soft.      Tenderness: There is no abdominal tenderness. There is no right CVA tenderness or left CVA tenderness.   Genitourinary:     Comments: Circumcised phallus, orthotopic meatus, bilaterally descended testicles without masses, or lesions.  Approx 1 x 1 cm area of erythema and crackling skin.  Nontender.  No drainage or bleeding.  Musculoskeletal:         General: Normal range of motion.      Cervical back: Normal range of motion.   Skin:     General: Skin is warm and dry.   Neurological:      General: No focal deficit present.      Mental Status: He is alert and oriented to person, place, and time.   Psychiatric:         Mood and Affect: Mood normal.         Behavior: Behavior normal. "         Labs:   Brief Urine Lab Results  (Last result in the past 365 days)        Color   Clarity   Blood   Leuk Est   Nitrite   Protein   CREAT   Urine HCG        01/23/24 1547             200                      Lab Results   Component Value Date    GLUCOSE 127 (H) 02/05/2024    CALCIUM 9.9 02/05/2024     02/05/2024    K 4.8 02/05/2024    CO2 25 02/05/2024     02/05/2024    BUN 8 02/05/2024    CREATININE 0.86 02/05/2024    EGFRIFNONA 98 07/08/2016    BCR 9 (L) 02/05/2024    ANIONGAP 13.0 11/02/2022       Lab Results   Component Value Date    WBC 6.8 01/11/2024    HGB 13.3 01/11/2024    HCT 38.6 01/11/2024    MCV 91 01/11/2024     01/11/2024       Images:   No Images in the past 120 days found..    Measures:   Tobacco:   Dakotah Verma  reports that he has never smoked. He has never been exposed to tobacco smoke. He has never used smokeless tobacco..     Assessment / Plan      Assessment/Plan:   73 y.o. male who presented today for follow up of grade group 2 prostate cancer status post XRT, as well as penile lesion.  For prostate cancer standpoint, he is doing well.  His PSA continues to decline nicely.  I will see him back in 6 months with a repeat PSA.  Approximate 1 x 1 cm penile lesion on the right shaft appears grossly unchanged, despite 1 month trial of Lotrisone cream.  Will refer to dermatology for evaluation for possible biopsy to rule out abnormal skin infection/malignancy.  Happy to be of assistance with biopsy if needed.  Patient voices understanding.  He will keep us up-to-date.  He is happy with our plan.    Diagnoses and all orders for this visit:    1. Penile lesion (Primary)  -     Ambulatory Referral to Dermatology    2. Prostate cancer  -     PSA Diagnostic; Future       Follow Up:   Return in about 6 months (around 9/11/2024) for f/u 6 mo with PSA.    I spent approximately 30 minutes providing clinical care for this patient; including review of patient's chart and provider  documentation, face to face time spent with patient in examination room (obtaining history, performing physical exam, discussing diagnosis and management options), placing orders, and completing patient documentation.     Evaristo Up MD  [facility] [place] [place] [facility] [place]

## 2024-03-13 RX ORDER — METFORMIN HYDROCHLORIDE 500 MG/1
TABLET, EXTENDED RELEASE ORAL
Qty: 180 TABLET | Refills: 1 | Status: SHIPPED | OUTPATIENT
Start: 2024-03-13

## 2024-07-22 RX ORDER — GABAPENTIN 100 MG/1
CAPSULE ORAL
Qty: 90 CAPSULE | Refills: 1 | Status: SHIPPED | OUTPATIENT
Start: 2024-07-22

## 2024-07-25 ENCOUNTER — TELEPHONE (OUTPATIENT)
Age: 74
End: 2024-07-25
Payer: MEDICARE

## 2024-07-25 NOTE — TELEPHONE ENCOUNTER
"Relay     \"Your appointment on Monday 09/23/24 with Dr. Up at [facility] will be at [address], [address], [place] [address]. We can be reached at [phone] if you have any questions or need to reschedule. Thank you and have a great day.  \"                 "

## 2024-08-05 ENCOUNTER — OFFICE VISIT (OUTPATIENT)
Dept: FAMILY MEDICINE CLINIC | Facility: CLINIC | Age: 74
End: 2024-08-05
Payer: MEDICARE

## 2024-08-05 VITALS
HEART RATE: 80 BPM | BODY MASS INDEX: 24.78 KG/M2 | WEIGHT: 177 LBS | DIASTOLIC BLOOD PRESSURE: 70 MMHG | OXYGEN SATURATION: 99 % | HEIGHT: 71 IN | SYSTOLIC BLOOD PRESSURE: 120 MMHG

## 2024-08-05 DIAGNOSIS — E11.9 TYPE 2 DIABETES MELLITUS WITHOUT COMPLICATION, WITHOUT LONG-TERM CURRENT USE OF INSULIN: Primary | ICD-10-CM

## 2024-08-05 DIAGNOSIS — R53.83 OTHER FATIGUE: ICD-10-CM

## 2024-08-05 DIAGNOSIS — K21.00 GASTROESOPHAGEAL REFLUX DISEASE WITH ESOPHAGITIS WITHOUT HEMORRHAGE: ICD-10-CM

## 2024-08-05 DIAGNOSIS — E55.9 VITAMIN D DEFICIENCY: ICD-10-CM

## 2024-08-05 DIAGNOSIS — D53.1 MEGALOBLASTIC ANEMIA: ICD-10-CM

## 2024-08-05 DIAGNOSIS — Z13.220 SCREENING, LIPID: ICD-10-CM

## 2024-08-05 PROCEDURE — 96160 PT-FOCUSED HLTH RISK ASSMT: CPT | Performed by: FAMILY MEDICINE

## 2024-08-05 PROCEDURE — 1126F AMNT PAIN NOTED NONE PRSNT: CPT | Performed by: FAMILY MEDICINE

## 2024-08-05 PROCEDURE — 1160F RVW MEDS BY RX/DR IN RCRD: CPT | Performed by: FAMILY MEDICINE

## 2024-08-05 PROCEDURE — 1159F MED LIST DOCD IN RCRD: CPT | Performed by: FAMILY MEDICINE

## 2024-08-05 PROCEDURE — 99214 OFFICE O/P EST MOD 30 MIN: CPT | Performed by: FAMILY MEDICINE

## 2024-08-05 PROCEDURE — G0439 PPPS, SUBSEQ VISIT: HCPCS | Performed by: FAMILY MEDICINE

## 2024-08-05 RX ORDER — METFORMIN HYDROCHLORIDE 500 MG/1
TABLET, EXTENDED RELEASE ORAL
Qty: 180 TABLET | Refills: 1 | Status: SHIPPED | OUTPATIENT
Start: 2024-08-05 | End: 2024-08-12 | Stop reason: SDUPTHER

## 2024-08-05 NOTE — PROGRESS NOTES
Subjective   The ABCs of the Annual Wellness Visit  Medicare Wellness Visit      Dakotah Verma is a 73 y.o. patient who presents for a Medicare Wellness Visit.    The following portions of the patient's history were reviewed and   updated as appropriate: allergies, current medications, past family history, past medical history, past social history, past surgical history, and problem list.    Compared to one year ago, the patient's physical   health is better.  Compared to one year ago, the patient's mental   health is the same.    Recent Hospitalizations:  He was not admitted to the hospital during the last year.     Current Medical Providers:  Patient Care Team:  David Dorsey MD as PCP - General (Family Medicine)  Roldan, Ezio Sevilla MD as Consulting Physician (Radiation Oncology)  Evaristo Up MD as Referring Physician (Urology)  Frederic Cage MD as Consulting Physician (Gastroenterology)    Outpatient Medications Prior to Visit   Medication Sig Dispense Refill    Accu-Chek Softclix Lancets lancets 1 each by Other route Daily. Once a day. In the morning 100 each 1    gabapentin (NEURONTIN) 100 MG capsule 1 to 3 at night 90 capsule 1    glucose blood (Accu-Chek Leah Plus) test strip TEST ONCE DAILY 100 each 5    lisinopril (PRINIVIL,ZESTRIL) 5 MG tablet TAKE 1 TABLET EVERY DAY 90 tablet 3    omeprazole (priLOSEC) 20 MG capsule TAKE 1 CAPSULE EVERY DAY 90 capsule 3    vitamin B-12 (CYANOCOBALAMIN) 1000 MCG tablet Take 1 tablet by mouth Daily.      metFORMIN ER (GLUCOPHAGE-XR) 500 MG 24 hr tablet TAKE 1 TABLET TWICE DAILY WITH MEALS 180 tablet 1    clotrimazole-betamethasone (LOTRISONE) 1-0.05 % cream Apply 1 application  topically to the appropriate area as directed 2 (Two) Times a Day. 45 g 3    potassium chloride 10 MEQ CR tablet Take 1 tablet by mouth 2 (Two) Times a Day. 30 tablet 0     No facility-administered medications prior to visit.     No opioid medication identified on active  "medication list. I have reviewed chart for other potential  high risk medication/s and harmful drug interactions in the elderly.      Aspirin is not on active medication list.  Aspirin use is not indicated based on review of current medical condition/s. Risk of harm outweighs potential benefits.  .    Patient Active Problem List   Diagnosis    Diabetes mellitus    GERD (gastroesophageal reflux disease)    Leukocytosis    Atypical chest pain    Elevated prostate specific antigen (PSA)    Prostate cancer    Nonalcoholic fatty liver disease     Advance Care Planning (Click this link to access ACP Navigator)  Advance Directive is not on file.  ACP discussion was held with the patient during this visit. Patient has an advance directive (not in EMR), copy requested.        Objective   Vitals:    24 0757   BP: 120/70   Pulse: 80   SpO2: 99%   Weight: 80.3 kg (177 lb)   Height: 180.3 cm (71\")   PainSc: 0-No pain       Estimated body mass index is 24.69 kg/m² as calculated from the following:    Height as of this encounter: 180.3 cm (71\").    Weight as of this encounter: 80.3 kg (177 lb).    BMI is within normal parameters. No other follow-up for BMI required.        Does the patient have evidence of cognitive impairment? 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     Alcohol Consumption:  Social History     Substance and Sexual Activity   Alcohol Use Never     Fall Risk Screen:  SURAJADI Fall Risk Assessment was completed, and patient is at LOW risk for falls.Assessment completed on:2024    Depression Screenin/5/2024     7:56 AM   PHQ-2/PHQ-9 Depression Screening   Little Interest or Pleasure in Doing Things 0-->not at all "   Feeling Down, Depressed or Hopeless 0-->not at all   PHQ-9: Brief Depression Severity Measure Score 0     Health Habits and Functional and Cognitive Screenin/3/2024     4:36 PM   Functional & Cognitive Status   Do you have difficulty preparing food and eating? No   Do you have difficulty bathing yourself, getting dressed or grooming yourself? No   Do you have difficulty using the toilet? No   Do you have difficulty moving around from place to place? No   Do you have trouble with steps or getting out of a bed or a chair? No   Current Diet Well Balanced Diet   Dental Exam Up to date   Eye Exam Up to date   Exercise (times per week) 0 times per week   Current Exercises Include No Regular Exercise;House Cleaning;Yard Work   Do you need help using the phone?  No   Are you deaf or do you have serious difficulty hearing?  No   Do you need help to go to places out of walking distance? No   Do you need help shopping? No   Do you need help preparing meals?  No   Do you need help with housework?  No   Do you need help with laundry? No   Do you need help taking your medications? No   Do you need help managing money? No   Do you ever drive or ride in a car without wearing a seat belt? No   Have you felt unusual stress, anger or loneliness in the last month? No   Who do you live with? Alone   If you need help, do you have trouble finding someone available to you? No   Have you been bothered in the last four weeks by sexual problems? No   Do you have difficulty concentrating, remembering or making decisions? No             Age-appropriate Screening Schedule:  Refer to the list below for future screening recommendations based on patient's age, sex and/or medical conditions. Orders for these recommended tests are listed in the plan section. The patient has been provided with a written plan.    Health Maintenance List  Health Maintenance   Topic Date Due    Pneumococcal Vaccine 65+ (2 of 2 - PPSV23 or PCV20) 2017     "DIABETIC FOOT EXAM  Never done    HEMOGLOBIN A1C  02/04/2024    DIABETIC EYE EXAM  06/12/2024    LIPID PANEL  08/04/2024    INFLUENZA VACCINE  08/01/2024    COVID-19 Vaccine (4 - 2023-24 season) 10/25/2024 (Originally 9/1/2023)    URINE MICROALBUMIN  01/23/2025    ANNUAL WELLNESS VISIT  08/05/2025    TDAP/TD VACCINES (2 - Td or Tdap) 08/16/2029    COLORECTAL CANCER SCREENING  01/27/2033    HEPATITIS C SCREENING  Completed    ZOSTER VACCINE  Completed                                                                                                                                                CMS Preventative Services Quick Reference  Risk Factors Identified During Encounter  None Identified    The above risks/problems have been discussed with the patient.  Pertinent information has been shared with the patient in the After Visit Summary.  An After Visit Summary and PPPS were made available to the patient.    Follow Up:   Next Medicare Wellness visit to be scheduled in 1 year.         Additional E&M Note during same encounter follows:  Patient has additional, significant, and separately identifiable condition(s)/problem(s) that require work above and beyond the Medicare Wellness Visit     Chief Complaint  Medicare Wellness-subsequent (Physical./Pt fasting)    Subjective   HPI    Daniel Emerson is a 73-year-old white male for diabetes mellitus, GERD, B12 deficiency, and prostate cancer, treated.  He also have a skin cancer on his penis treated Dr. Ross              Objective   Vital Signs:  /70   Pulse 80   Ht 180.3 cm (71\")   Wt 80.3 kg (177 lb)   SpO2 99%   BMI 24.69 kg/m²   Physical Exam  Vitals and nursing note reviewed.   Constitutional:       Appearance: Normal appearance. He is normal weight.   HENT:      Head: Normocephalic.      Right Ear: External ear normal.      Left Ear: External ear normal.      Nose: Nose normal.   Eyes:      Pupils: Pupils are equal, round, and reactive to light.   Neck:     "  Vascular: No carotid bruit.   Cardiovascular:      Rate and Rhythm: Normal rate and regular rhythm.      Pulses: Normal pulses.      Heart sounds: Normal heart sounds.   Pulmonary:      Effort: Pulmonary effort is normal.      Breath sounds: Normal breath sounds.   Abdominal:      General: There is no distension.      Tenderness: There is no abdominal tenderness. There is no guarding or rebound.   Musculoskeletal:      Cervical back: Normal range of motion and neck supple.   Skin:     General: Skin is warm and dry.   Neurological:      Mental Status: He is alert.   Psychiatric:         Mood and Affect: Mood normal.                 Assessment and Plan               Type 2 diabetes mellitus without complication, without long-term current use of insulin    Other fatigue    Vitamin D deficiency    Megaloblastic anemia    Screening, lipid    Gastroesophageal reflux disease with esophagitis without hemorrhage      Orders Placed This Encounter   Procedures    Comprehensive Metabolic Panel     Order Specific Question:   Release to patient     Answer:   Routine Release [[id number]]    Hemoglobin A1c     Order Specific Question:   Release to patient     Answer:   Routine Release [[id number]]    Lipid Panel     Order Specific Question:   Release to patient     Answer:   Routine Release [[id number]]    Vitamin D,25-Hydroxy     Order Specific Question:   Release to patient     Answer:   Routine Release [[id number]]    TSH     Order Specific Question:   Release to patient     Answer:   Routine Release [[id number]]    Vitamin B12 & Folate     Order Specific Question:   Release to patient     Answer:   Routine Release [[id number]]    CBC & Differential     Order Specific Question:   Manual Differential     Answer:   No     Order Specific Question:   Release to patient     Answer:   Routine Release [[id number]]     New Medications Ordered This Visit   Medications    metFORMIN ER (GLUCOPHAGE-XR) 500 MG 24 hr tablet     Sig: TAKE 1  TABLET TWICE DAILY WITH MEALS     Dispense:  180 tablet     Refill:  1          Follow Up   Return in about 6 months (around 2/5/2025).  Patient was given instructions and counseling regarding his condition or for health maintenance advice. Please see specific information pulled into the AVS if appropriate.

## 2024-08-06 LAB
25(OH)D3+25(OH)D2 SERPL-MCNC: 27 NG/ML (ref 30–100)
ALBUMIN SERPL-MCNC: 4.6 G/DL (ref 3.8–4.8)
ALP SERPL-CCNC: 87 IU/L (ref 44–121)
ALT SERPL-CCNC: 19 IU/L (ref 0–44)
AST SERPL-CCNC: 25 IU/L (ref 0–40)
BASOPHILS # BLD AUTO: 0.1 X10E3/UL (ref 0–0.2)
BASOPHILS NFR BLD AUTO: 1 %
BILIRUB SERPL-MCNC: 1.7 MG/DL (ref 0–1.2)
BUN SERPL-MCNC: 11 MG/DL (ref 8–27)
BUN/CREAT SERPL: 12 (ref 10–24)
CALCIUM SERPL-MCNC: 9.9 MG/DL (ref 8.6–10.2)
CHLORIDE SERPL-SCNC: 101 MMOL/L (ref 96–106)
CHOLEST SERPL-MCNC: 146 MG/DL (ref 100–199)
CO2 SERPL-SCNC: 24 MMOL/L (ref 20–29)
CREAT SERPL-MCNC: 0.95 MG/DL (ref 0.76–1.27)
EGFRCR SERPLBLD CKD-EPI 2021: 86 ML/MIN/1.73
EOSINOPHIL # BLD AUTO: 0.1 X10E3/UL (ref 0–0.4)
EOSINOPHIL NFR BLD AUTO: 1 %
ERYTHROCYTE [DISTWIDTH] IN BLOOD BY AUTOMATED COUNT: 13.3 % (ref 11.6–15.4)
FOLATE SERPL-MCNC: 9.1 NG/ML
GLOBULIN SER CALC-MCNC: 2.5 G/DL (ref 1.5–4.5)
GLUCOSE SERPL-MCNC: 129 MG/DL (ref 70–99)
HBA1C MFR BLD: 6.7 % (ref 4.8–5.6)
HCT VFR BLD AUTO: 44.4 % (ref 37.5–51)
HDLC SERPL-MCNC: 50 MG/DL
HGB BLD-MCNC: 14.5 G/DL (ref 13–17.7)
IMM GRANULOCYTES # BLD AUTO: 0 X10E3/UL (ref 0–0.1)
IMM GRANULOCYTES NFR BLD AUTO: 0 %
LDLC SERPL CALC-MCNC: 71 MG/DL (ref 0–99)
LYMPHOCYTES # BLD AUTO: 1.6 X10E3/UL (ref 0.7–3.1)
LYMPHOCYTES NFR BLD AUTO: 20 %
MCH RBC QN AUTO: 31.6 PG (ref 26.6–33)
MCHC RBC AUTO-ENTMCNC: 32.7 G/DL (ref 31.5–35.7)
MCV RBC AUTO: 97 FL (ref 79–97)
MONOCYTES # BLD AUTO: 0.5 X10E3/UL (ref 0.1–0.9)
MONOCYTES NFR BLD AUTO: 6 %
NEUTROPHILS # BLD AUTO: 5.8 X10E3/UL (ref 1.4–7)
NEUTROPHILS NFR BLD AUTO: 72 %
PLATELET # BLD AUTO: 278 X10E3/UL (ref 150–450)
POTASSIUM SERPL-SCNC: 4.3 MMOL/L (ref 3.5–5.2)
PROT SERPL-MCNC: 7.1 G/DL (ref 6–8.5)
RBC # BLD AUTO: 4.59 X10E6/UL (ref 4.14–5.8)
SODIUM SERPL-SCNC: 142 MMOL/L (ref 134–144)
TRIGL SERPL-MCNC: 146 MG/DL (ref 0–149)
TSH SERPL DL<=0.005 MIU/L-ACNC: 2.27 UIU/ML (ref 0.45–4.5)
VIT B12 SERPL-MCNC: 488 PG/ML (ref 232–1245)
VLDLC SERPL CALC-MCNC: 25 MG/DL (ref 5–40)
WBC # BLD AUTO: 8.1 X10E3/UL (ref 3.4–10.8)

## 2024-08-07 RX ORDER — METFORMIN HYDROCHLORIDE 500 MG/1
TABLET, EXTENDED RELEASE ORAL
Qty: 180 TABLET | Refills: 3 | OUTPATIENT
Start: 2024-08-07

## 2024-08-12 RX ORDER — METFORMIN HYDROCHLORIDE 500 MG/1
TABLET, EXTENDED RELEASE ORAL
Qty: 180 TABLET | Refills: 1 | Status: SHIPPED | OUTPATIENT
Start: 2024-08-12

## 2024-08-12 NOTE — TELEPHONE ENCOUNTER
Caller: Dakotah Verma    Relationship: Self    Best call back number: [phone]     Requested Prescriptions:   Requested Prescriptions     Pending Prescriptions Disp Refills    metFORMIN ER (GLUCOPHAGE-XR) 500 MG 24 hr tablet 180 tablet 1     Sig: TAKE 1 TABLET TWICE DAILY WITH MEALS        Pharmacy where request should be sent: [facility] - [facility] [address] - [phone]  - [phone] FX     Last office visit with prescribing clinician: 8/5/2024   Last telemedicine visit with prescribing clinician: Visit date not found   Next office visit with prescribing clinician: 2/3/2025     Additional details provided by patient: THIS WAS PREVIOUSLY SENT TO THE WRONG PHARMACY.     Does the patient have less than a 3 day supply:  [] Yes  [x] No    Would you like a call back once the refill request has been completed: [] Yes [x] No    If the office needs to give you a call back, can they leave a voicemail: [] Yes [x] No    Cadance Dunaway, RegSched Rep   08/12/24 09:48 EDT

## 2024-09-10 ENCOUNTER — LAB (OUTPATIENT)
Dept: LAB | Facility: HOSPITAL | Age: 74
End: 2024-09-10
Payer: MEDICARE

## 2024-09-10 PROCEDURE — 84153 ASSAY OF PSA TOTAL: CPT | Performed by: PHYSICIAN ASSISTANT

## 2024-09-23 ENCOUNTER — OFFICE VISIT (OUTPATIENT)
Age: 74
End: 2024-09-23
Payer: MEDICARE

## 2024-09-23 VITALS — SYSTOLIC BLOOD PRESSURE: 149 MMHG | OXYGEN SATURATION: 99 % | DIASTOLIC BLOOD PRESSURE: 70 MMHG | HEART RATE: 67 BPM

## 2024-09-23 DIAGNOSIS — C61 PROSTATE CANCER: Primary | ICD-10-CM

## 2024-09-23 DIAGNOSIS — C60.9 PENILE CANCER: ICD-10-CM

## 2024-09-23 PROCEDURE — 1160F RVW MEDS BY RX/DR IN RCRD: CPT | Performed by: STUDENT IN AN ORGANIZED HEALTH CARE EDUCATION/TRAINING PROGRAM

## 2024-09-23 PROCEDURE — 1159F MED LIST DOCD IN RCRD: CPT | Performed by: STUDENT IN AN ORGANIZED HEALTH CARE EDUCATION/TRAINING PROGRAM

## 2024-09-23 PROCEDURE — 99214 OFFICE O/P EST MOD 30 MIN: CPT | Performed by: STUDENT IN AN ORGANIZED HEALTH CARE EDUCATION/TRAINING PROGRAM

## 2024-10-14 ENCOUNTER — CLINICAL SUPPORT (OUTPATIENT)
Dept: RADIATION ONCOLOGY | Facility: HOSPITAL | Age: 74
End: 2024-10-14
Payer: MEDICARE

## 2024-10-14 ENCOUNTER — HOSPITAL ENCOUNTER (OUTPATIENT)
Dept: RADIATION ONCOLOGY | Facility: HOSPITAL | Age: 74
Setting detail: RADIATION/ONCOLOGY SERIES
Discharge: HOME | End: 2024-10-14
Payer: MEDICARE

## 2024-10-14 DIAGNOSIS — C61 PROSTATE CANCER: Primary | ICD-10-CM

## 2024-10-14 NOTE — PROGRESS NOTES
TELEMEDICINE FOLLOW-UP NOTE    10/14/2024    Problem List Items Addressed This Visit          Other    Prostate cancer - Primary    Cancer Staging   Stage IIB (cT1c, cN0, cM0, PSA: 8.7, Grade Group: 2)    Time Devoted to Visit: 10 min including time to review his previous imaging and records, with 50% devoted to direct discussion.    Reason for Visit:  I personally contacted Dakotah Verma  today in an effort to perform their scheduled follow-up visit remotely.    SUBJECTIVE:  With respect to their cancer diagnosis, Mr. Verma is a 73-year-old gentleman with a history of a favorable, intermediate risk prostate cancer.  He completed treatment with [employer] radiosurgery alone in March 2023.  He tolerated treatment very well and only had very minimal short-term urinary symptoms that have since completely resolved.  His PSA has also continued to downtrend nicely.  He is without any ongoing bowel or bladder symptoms.  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  He continues to be seen regularly by Dr. Jose R fuller.            EXAM FINDINGS AND/OR PROBLEMS:  No new findings    LABORATORY      ASSESSMENT/PLAN: Dakotah Verma is a 73 y.o. year old male with a history of a favorable, intermediate risk prostate cancer.  He is just over 1-1/2 years out from completion of therapy and clinically is doing very well without any signs of recurrent or ongoing disease.  More importantly, he also has no signs of late radiation side effects.  I again discussed the long-term survivorship model, and he will continue to be seen through the urology clinic with semiannual PSA testing.  We can see him back in our clinic on an as-needed basis.    RECOMMENDATIONS:  Return Follow-up care per Urology.    Ezio Montilla MD

## 2024-12-02 RX ORDER — LISINOPRIL 5 MG/1
5 TABLET ORAL DAILY
Qty: 90 TABLET | Refills: 3 | OUTPATIENT
Start: 2024-12-02

## 2025-01-07 RX ORDER — METFORMIN HYDROCHLORIDE 500 MG/1
TABLET, EXTENDED RELEASE ORAL
Qty: 180 TABLET | Refills: 3 | OUTPATIENT
Start: 2025-01-07

## 2025-01-08 RX ORDER — BLOOD SUGAR DIAGNOSTIC
STRIP MISCELLANEOUS
Qty: 100 EACH | Refills: 4 | Status: SHIPPED | OUTPATIENT
Start: 2025-01-08

## 2025-01-09 RX ORDER — GABAPENTIN 100 MG/1
CAPSULE ORAL
Qty: 90 CAPSULE | Refills: 2 | Status: SHIPPED | OUTPATIENT
Start: 2025-01-09

## 2025-02-14 ENCOUNTER — OFFICE VISIT (OUTPATIENT)
Dept: FAMILY MEDICINE CLINIC | Facility: CLINIC | Age: 75
End: 2025-02-14
Payer: MEDICARE

## 2025-02-14 VITALS
BODY MASS INDEX: 23.1 KG/M2 | HEIGHT: 71 IN | DIASTOLIC BLOOD PRESSURE: 80 MMHG | WEIGHT: 165 LBS | SYSTOLIC BLOOD PRESSURE: 130 MMHG | OXYGEN SATURATION: 100 % | HEART RATE: 80 BPM

## 2025-02-14 DIAGNOSIS — I10 PRIMARY HYPERTENSION: ICD-10-CM

## 2025-02-14 DIAGNOSIS — F13.11 SEDATIVE, HYPNOTIC OR ANXIOLYTIC ABUSE, IN REMISSION: ICD-10-CM

## 2025-02-14 DIAGNOSIS — E11.9 TYPE 2 DIABETES MELLITUS WITHOUT COMPLICATION, WITHOUT LONG-TERM CURRENT USE OF INSULIN: ICD-10-CM

## 2025-02-14 DIAGNOSIS — E55.9 VITAMIN D DEFICIENCY: Primary | ICD-10-CM

## 2025-02-14 LAB
AMPHET+METHAMPHET UR QL: NEGATIVE
AMPHETAMINE INTERNAL CONTROL: NORMAL
AMPHETAMINES UR QL: NEGATIVE
BARBITURATE INTERNAL CONTROL: NORMAL
BARBITURATES UR QL SCN: NEGATIVE
BENZODIAZ UR QL SCN: NEGATIVE
BENZODIAZEPINE INTERNAL CONTROL: NORMAL
BUPRENORPHINE INTERNAL CONTROL: NORMAL
BUPRENORPHINE SERPL-MCNC: NEGATIVE NG/ML
CANNABINOIDS SERPL QL: NEGATIVE
COCAINE INTERNAL CONTROL: NORMAL
COCAINE UR QL: NEGATIVE
EDDP INTERNAL CONTROL: NORMAL
EDDP UR QL SCN: NEGATIVE
MDMA (ECSTASY) INTERNAL CONTROL: NORMAL
MDMA UR QL SCN: NEGATIVE
METHADONE INTERNAL CONTROL: NORMAL
METHADONE UR QL SCN: NEGATIVE
METHAMPHETAMINE INTERNAL CONTROL: NORMAL
MORPHINE INTERNAL CONTROL: NORMAL
MORPHINE UR QL SCN: NEGATIVE
OXYCODONE INTERNAL CONTROL: NORMAL
OXYCODONE UR QL SCN: NEGATIVE
PCP UR QL SCN: NEGATIVE
PHENCYCLIDINE INTERNAL CONTROL: NORMAL
PROPOXYPH UR QL SCN: NEGATIVE
PROPOXYPHENE INTERNAL CONTROL: NORMAL
THC INTERNAL CONTROL: NORMAL
TRICYCLIC ANTIDEPRESSANTS INTERNAL CONTROL: NORMAL
TRICYCLICS UR QL SCN: NEGATIVE

## 2025-02-14 RX ORDER — BLOOD-GLUCOSE METER
1 EACH MISCELLANEOUS DAILY
Qty: 1 KIT | Refills: 0 | Status: SHIPPED | OUTPATIENT
Start: 2025-02-14

## 2025-02-14 RX ORDER — LISINOPRIL 5 MG/1
5 TABLET ORAL DAILY
Qty: 90 TABLET | Refills: 3 | Status: SHIPPED | OUTPATIENT
Start: 2025-02-14

## 2025-02-14 RX ORDER — LANCING DEVICE/LANCETS
1 KIT MISCELLANEOUS DAILY
Qty: 1 KIT | Refills: 0 | Status: SHIPPED | OUTPATIENT
Start: 2025-02-14

## 2025-02-14 RX ORDER — METFORMIN HYDROCHLORIDE 500 MG/1
TABLET, EXTENDED RELEASE ORAL
Qty: 180 TABLET | Refills: 3 | Status: SHIPPED | OUTPATIENT
Start: 2025-02-14

## 2025-02-14 NOTE — PROGRESS NOTES
Follow Up Office Visit      Date of Visit:  2025   Patient Name: Dakotah Verma  : 1950   MRN: [id number]     Chief Complaint:    Chief Complaint   Patient presents with    Med Refill       History of Present Illness: Dakotah Verma is a 74 y.o. male who is here today for follow up.    History of Present Illness  The patient presents for evaluation of diabetes, vitamin D deficiency, and medication management.    He has been utilizing an Accu-Chek device for blood glucose monitoring since 2017, which is now exhibiting signs of wear and tear. The lancet is no longer effectively puncturing his skin. He is seeking a replacement for the device, specifically the gun component, as the meter remains functional. His current regimen includes daily blood glucose checks using the Accu-Chek strips and lancets.    His vitamin D levels were previously found to be low, prompting him to self-administer a weekly dose of 1000 IU of vitamin D3. He is interested in assessing the efficacy of this intervention through blood work.    He is currently on a daily dose of gabapentin 100 mg, which he reports as being highly effective. He is curious about potential long-term side effects associated with this medication. He also mentions that he has been receiving his other medications, metformin, omeprazole, and lisinopril, via mail order, but prefers to switch to [facility] for future refills.    FAMILY HISTORY  The patient mentions that his sister has diabetes.    MEDICATIONS  Current: Metformin, omeprazole, lisinopril, gabapentin, vitamin D3      Subjective      Review of Systems:   Review of Systems    Past Medical History:   Past Medical History:   Diagnosis Date    Chronic kidney disease     Colon polyp     have history of    Diverticulosis     have history of    DM (diabetes mellitus)     Elevated PSA     Fatty liver     GERD (gastroesophageal reflux disease)     taking med for    GERD with esophagitis      Nonalcoholic fatty liver disease     Prostate cancer     Prostatitis     Sciatica, left side     Speech abnormality     Pt has stuttered as a child, sometimes  has some trouble getting words out.    Tonsillitis     Vitamin D deficiency        Past Surgical History:   Past Surgical History:   Procedure Laterality Date    ADENOIDECTOMY      COLONOSCOPY      2016    CYBERKNIFE  03/13/2023    Prostate/SV    LUMBAR DISCECTOMY  10/30/2014    PROSTATE BIOPSY Bilateral 11/04/2022    Procedure: PROSTATE ULTRASOUND BIOPSY MRI FUSION WITH URONAV;  Surgeon: Evaristo Up MD;  Location: [facility];  Service: Urology;  Laterality: Bilateral;    PROSTATE SURGERY  11/05/2022    Biopsy    TONSILLECTOMY         Family History:   Family History   Problem Relation Age of Onset    Diabetes type II Mother     Hypertension Mother         PULMONARY    Stroke Mother     Mental illness Mother     COPD Father     Diabetes type II Sister     Asthma Sister     Diabetes Sister     Diabetes Sister     Diabetes Sister     Hypertension Sister        Social History:   Social History     Socioeconomic History    Marital status:    Tobacco Use    Smoking status: Never     Passive exposure: Never    Smokeless tobacco: Never    Tobacco comments:     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   Vaping Use    Vaping status: Never Used   Substance and Sexual Activity    Alcohol use: Never    Drug use: Never    Sexual activity: Not Currently     Partners: Female     Birth control/protection: None     Comment:  and not sexually active.       Medications:     Current Outpatient Medications:     Accu-Chek Softclix Lancets lancets, 1 each by Other route Daily. Once a day. In the morning, Disp: 100 each, Rfl: 1    gabapentin (NEURONTIN) 100 MG capsule, TAKE 1 TO 3 CAPSULES BY MOUTH AT NIGHT, Disp: 90 capsule, Rfl: 2    glucose blood (Accu-Chek Leah Plus) test strip, TEST EVERY DAY, Disp: 100 each, Rfl: 4    lisinopril  "(PRINIVIL,ZESTRIL) 5 MG tablet, Take 1 tablet by mouth Daily., Disp: 90 tablet, Rfl: 3    metFORMIN ER (GLUCOPHAGE-XR) 500 MG 24 hr tablet, TAKE 1 TABLET TWICE DAILY WITH MEALS, Disp: 180 tablet, Rfl: 3    omeprazole (priLOSEC) 20 MG capsule, Take 1 capsule by mouth Daily., Disp: 90 capsule, Rfl: 3    vitamin B-12 (CYANOCOBALAMIN) 1000 MCG tablet, Take 1 tablet by mouth Daily., Disp: , Rfl:     Blood Glucose Monitoring Suppl (Accu-Chek Guide Me) w/Device kit, Use 1 each Daily. whichever they pay for, Disp: 1 kit, Rfl: 0    Lancets Misc. (Accu-Chek Softclix Lancet Dev) kit, Use 1 each Daily., Disp: 1 kit, Rfl: 0    Allergies:   Allergies   Allergen Reactions    Sulfa Antibiotics Hives and Itching       Objective     Physical Exam:  Vital Signs:   Vitals:    02/14/25 0945   BP: 130/80   Pulse: 80   SpO2: 100%   Weight: 74.8 kg (165 lb)   Height: 180.3 cm (71\")     Body mass index is 23.01 kg/m².     Physical Exam  Vitals and nursing note reviewed.   Constitutional:       Appearance: Normal appearance. He is normal weight.   HENT:      Head: Normocephalic.      Right Ear: External ear normal.      Left Ear: External ear normal.      Nose: Nose normal.   Eyes:      Pupils: Pupils are equal, round, and reactive to light.   Musculoskeletal:      Cervical back: Normal range of motion and neck supple.   Skin:     General: Skin is warm and dry.   Neurological:      Mental Status: He is alert.   Psychiatric:         Mood and Affect: Mood normal.         Behavior: Behavior normal.       Physical Exam      Results  Laboratory Studies  Vitamin D level was 30.    Procedures      Assessment / Plan      Assessment/Plan:   Diagnoses and all orders for this visit:    1. Vitamin D deficiency (Primary)  -     Vitamin D,25-Hydroxy    2. Type 2 diabetes mellitus without complication, without long-term current use of insulin  -     Microalbumin / Creatinine Urine Ratio - Urine, Clean Catch  -      POC Urine Drug Screen  -     Hemoglobin " A1c    3. Primary hypertension  -     Comprehensive Metabolic Panel    4. Sedative, hypnotic or anxiolytic abuse, in remission  -      POC Urine Drug Screen    Other orders  -     Lancets Misc. (Accu-Chek Softclix Lancet Dev) kit; Use 1 each Daily.  Dispense: 1 kit; Refill: 0  -     Blood Glucose Monitoring Suppl (Accu-Chek Guide Me) w/Device kit; Use 1 each Daily. whichever they pay for  Dispense: 1 kit; Refill: 0  -     metFORMIN ER (GLUCOPHAGE-XR) 500 MG 24 hr tablet; TAKE 1 TABLET TWICE DAILY WITH MEALS  Dispense: 180 tablet; Refill: 3  -     lisinopril (PRINIVIL,ZESTRIL) 5 MG tablet; Take 1 tablet by mouth Daily.  Dispense: 90 tablet; Refill: 3  -     omeprazole (priLOSEC) 20 MG capsule; Take 1 capsule by mouth Daily.  Dispense: 90 capsule; Refill: 3       Assessment & Plan  1. Diabetes mellitus.  A prescription for the Accu-Chek Softclix lancet device kit will be provided. He is advised to continue monitoring his blood glucose levels daily.    2. Vitamin D deficiency.  His vitamin D levels have been consistently borderline, with a recent reading of 27. He is currently taking 1000 IU of vitamin D3 once a week. He is advised to increase the dosage to 1000 IU three times a week. A comprehensive blood work panel, including vitamin D levels, will be ordered to monitor the effectiveness of this adjustment.    3. Medication management.  Prescriptions for metformin, lisinopril, omeprazole, and gabapentin will be refilled and sent to [facility] pharmacy. He is advised to contact [facility] to cancel the mail order service for these medications.    Follow-up  The patient is scheduled for a follow-up visit on 08/06/2025.    Follow Up:   Return in about 6 months (around 8/14/2025).    Patient or patient representative verbalized consent for the use of Ambient Listening during the visit with  David Dorsey MD for chart documentation. 2/14/2025  10:13 EST     [email or web address] [place]

## 2025-02-15 LAB
25(OH)D3+25(OH)D2 SERPL-MCNC: 27.4 NG/ML (ref 30–100)
ALBUMIN SERPL-MCNC: 4.6 G/DL (ref 3.8–4.8)
ALBUMIN/CREAT UR: 4 MG/G CREAT (ref 0–29)
ALP SERPL-CCNC: 89 IU/L (ref 44–121)
ALT SERPL-CCNC: 9 IU/L (ref 0–44)
AST SERPL-CCNC: 17 IU/L (ref 0–40)
BILIRUB SERPL-MCNC: 2.1 MG/DL (ref 0–1.2)
BUN SERPL-MCNC: 9 MG/DL (ref 8–27)
BUN/CREAT SERPL: 10 (ref 10–24)
CALCIUM SERPL-MCNC: 9.7 MG/DL (ref 8.6–10.2)
CHLORIDE SERPL-SCNC: 99 MMOL/L (ref 96–106)
CO2 SERPL-SCNC: 27 MMOL/L (ref 20–29)
CREAT SERPL-MCNC: 0.86 MG/DL (ref 0.76–1.27)
CREAT UR-MCNC: 84.3 MG/DL
EGFRCR SERPLBLD CKD-EPI 2021: 92 ML/MIN/1.73
GLOBULIN SER CALC-MCNC: 2.5 G/DL (ref 1.5–4.5)
GLUCOSE SERPL-MCNC: 107 MG/DL (ref 70–99)
HBA1C MFR BLD: 6.3 % (ref 4.8–5.6)
MICROALBUMIN UR-MCNC: 3.1 UG/ML
POTASSIUM SERPL-SCNC: 3.6 MMOL/L (ref 3.5–5.2)
PROT SERPL-MCNC: 7.1 G/DL (ref 6–8.5)
SODIUM SERPL-SCNC: 141 MMOL/L (ref 134–144)

## 2025-02-18 ENCOUNTER — TELEPHONE (OUTPATIENT)
Dept: FAMILY MEDICINE CLINIC | Facility: CLINIC | Age: 75
End: 2025-02-18
Payer: MEDICARE

## 2025-02-18 NOTE — TELEPHONE ENCOUNTER
Patient informed and voiced understanding.      Will call back to [facility] and schedule lab only visit for 4 months     ----- Message from David Dorsey sent at 2/15/2025  5:47 PM EST -----  Dakotah, all your lab work was normal except the vitamin D went for 27.0 to 27.4. I would take it every day. A1c = 6.3% which means you are averaging 130. The potassium getting low side of normal at 3.6. have you citrus fruits, kiwi, bananas, broccoli and spinach and most of all eat skin on the potato.  Recheck in 4 months

## 2025-03-25 ENCOUNTER — LAB (OUTPATIENT)
Dept: LAB | Facility: HOSPITAL | Age: 75
End: 2025-03-25
Payer: MEDICARE

## 2025-03-25 DIAGNOSIS — C61 PROSTATE CANCER: ICD-10-CM

## 2025-03-25 LAB — PSA SERPL-MCNC: 0.38 NG/ML (ref 0–4)

## 2025-03-25 PROCEDURE — 84153 ASSAY OF PSA TOTAL: CPT

## 2025-03-25 PROCEDURE — 36415 COLL VENOUS BLD VENIPUNCTURE: CPT

## 2025-04-09 ENCOUNTER — TELEPHONE (OUTPATIENT)
Dept: FAMILY MEDICINE CLINIC | Facility: CLINIC | Age: 75
End: 2025-04-09
Payer: MEDICARE

## 2025-04-09 RX ORDER — AVOBENZONE, HOMOSALATE, OCTISALATE, OCTOCRYLENE 30; 40; 45; 26 MG/ML; MG/ML; MG/ML; MG/ML
1 CREAM TOPICAL DAILY PRN
Qty: 100 EACH | Refills: 2 | Status: SHIPPED | OUTPATIENT
Start: 2025-04-09

## 2025-04-09 NOTE — TELEPHONE ENCOUNTER
Miscellaneous glucose monitor kit and lancets sent.    1 kit  100 with 2 refills of the lancets sent

## 2025-04-09 NOTE — TELEPHONE ENCOUNTER
FAMILIA WITH [facility]S PHARMACY STATED THAT THEY NEED THE NEW SCRIPTS FOR THE NEW GUIDE TESTERS.

## 2025-05-09 ENCOUNTER — EXTERNAL PBMM DATA (OUTPATIENT)
Dept: PHARMACY | Facility: OTHER | Age: 75
End: 2025-05-09
Payer: MEDICARE

## 2025-07-10 RX ORDER — BLOOD SUGAR DIAGNOSTIC
STRIP MISCELLANEOUS
Qty: 100 EACH | Refills: 11 | Status: SHIPPED | OUTPATIENT
Start: 2025-07-10

## 2025-07-10 RX ORDER — GABAPENTIN 100 MG/1
CAPSULE ORAL
Qty: 90 CAPSULE | Refills: 2 | Status: SHIPPED | OUTPATIENT
Start: 2025-07-10

## 2025-08-25 ENCOUNTER — OFFICE VISIT (OUTPATIENT)
Dept: FAMILY MEDICINE CLINIC | Facility: CLINIC | Age: 75
End: 2025-08-25
Payer: MEDICARE

## 2025-08-25 VITALS
BODY MASS INDEX: 23.24 KG/M2 | HEART RATE: 68 BPM | HEIGHT: 71 IN | SYSTOLIC BLOOD PRESSURE: 148 MMHG | OXYGEN SATURATION: 97 % | WEIGHT: 166 LBS | DIASTOLIC BLOOD PRESSURE: 90 MMHG

## 2025-08-25 DIAGNOSIS — C61 PROSTATE CANCER: ICD-10-CM

## 2025-08-25 DIAGNOSIS — R53.83 OTHER FATIGUE: ICD-10-CM

## 2025-08-25 DIAGNOSIS — E87.6 HYPOKALEMIA: ICD-10-CM

## 2025-08-25 DIAGNOSIS — I10 PRIMARY HYPERTENSION: ICD-10-CM

## 2025-08-25 DIAGNOSIS — E11.9 TYPE 2 DIABETES MELLITUS WITHOUT COMPLICATION, WITHOUT LONG-TERM CURRENT USE OF INSULIN: Primary | ICD-10-CM

## 2025-08-25 DIAGNOSIS — D53.1 MEGALOBLASTIC ANEMIA: ICD-10-CM

## 2025-08-25 DIAGNOSIS — K21.00 GASTROESOPHAGEAL REFLUX DISEASE WITH ESOPHAGITIS WITHOUT HEMORRHAGE: ICD-10-CM

## 2025-08-25 DIAGNOSIS — E55.9 VITAMIN D DEFICIENCY: ICD-10-CM

## 2025-08-25 DIAGNOSIS — Z13.220 SCREENING, LIPID: ICD-10-CM

## 2025-08-25 PROCEDURE — 1160F RVW MEDS BY RX/DR IN RCRD: CPT | Performed by: FAMILY MEDICINE

## 2025-08-25 PROCEDURE — 1159F MED LIST DOCD IN RCRD: CPT | Performed by: FAMILY MEDICINE

## 2025-08-25 PROCEDURE — 3044F HG A1C LEVEL LT 7.0%: CPT | Performed by: FAMILY MEDICINE

## 2025-08-25 PROCEDURE — 1126F AMNT PAIN NOTED NONE PRSNT: CPT | Performed by: FAMILY MEDICINE

## 2025-08-25 PROCEDURE — 99214 OFFICE O/P EST MOD 30 MIN: CPT | Performed by: FAMILY MEDICINE

## 2025-08-26 LAB
25(OH)D3+25(OH)D2 SERPL-MCNC: 31.7 NG/ML (ref 30–100)
ALBUMIN SERPL-MCNC: 4.4 G/DL (ref 3.8–4.8)
ALP SERPL-CCNC: 80 IU/L (ref 44–121)
ALT SERPL-CCNC: 9 IU/L (ref 0–44)
AST SERPL-CCNC: 15 IU/L (ref 0–40)
BASOPHILS # BLD AUTO: 0.1 X10E3/UL (ref 0–0.2)
BASOPHILS NFR BLD AUTO: 1 %
BILIRUB SERPL-MCNC: 2 MG/DL (ref 0–1.2)
BUN SERPL-MCNC: 9 MG/DL (ref 8–27)
BUN/CREAT SERPL: 11 (ref 10–24)
CALCIUM SERPL-MCNC: 9.7 MG/DL (ref 8.6–10.2)
CHLORIDE SERPL-SCNC: 100 MMOL/L (ref 96–106)
CHOLEST SERPL-MCNC: 139 MG/DL (ref 100–199)
CO2 SERPL-SCNC: 22 MMOL/L (ref 20–29)
CREAT SERPL-MCNC: 0.83 MG/DL (ref 0.76–1.27)
EGFRCR SERPLBLD CKD-EPI 2021: 93 ML/MIN/1.73
EOSINOPHIL # BLD AUTO: 0.1 X10E3/UL (ref 0–0.4)
EOSINOPHIL NFR BLD AUTO: 1 %
ERYTHROCYTE [DISTWIDTH] IN BLOOD BY AUTOMATED COUNT: 12.9 % (ref 11.6–15.4)
FOLATE SERPL-MCNC: 3.4 NG/ML
GLOBULIN SER CALC-MCNC: 2.6 G/DL (ref 1.5–4.5)
GLUCOSE SERPL-MCNC: 113 MG/DL (ref 70–99)
HBA1C MFR BLD: 6.3 % (ref 4.8–5.6)
HCT VFR BLD AUTO: 41.1 % (ref 37.5–51)
HDLC SERPL-MCNC: 51 MG/DL
HGB BLD-MCNC: 13.5 G/DL (ref 13–17.7)
IMM GRANULOCYTES # BLD AUTO: 0 X10E3/UL (ref 0–0.1)
IMM GRANULOCYTES NFR BLD AUTO: 0 %
LDLC SERPL CALC-MCNC: 69 MG/DL (ref 0–99)
LYMPHOCYTES # BLD AUTO: 1.9 X10E3/UL (ref 0.7–3.1)
LYMPHOCYTES NFR BLD AUTO: 24 %
MAGNESIUM SERPL-MCNC: 1.7 MG/DL (ref 1.6–2.3)
MCH RBC QN AUTO: 31.5 PG (ref 26.6–33)
MCHC RBC AUTO-ENTMCNC: 32.8 G/DL (ref 31.5–35.7)
MCV RBC AUTO: 96 FL (ref 79–97)
MONOCYTES # BLD AUTO: 0.5 X10E3/UL (ref 0.1–0.9)
MONOCYTES NFR BLD AUTO: 6 %
NEUTROPHILS # BLD AUTO: 5.4 X10E3/UL (ref 1.4–7)
NEUTROPHILS NFR BLD AUTO: 68 %
PLATELET # BLD AUTO: 270 X10E3/UL (ref 150–450)
POTASSIUM SERPL-SCNC: 4 MMOL/L (ref 3.5–5.2)
PROT SERPL-MCNC: 7 G/DL (ref 6–8.5)
RBC # BLD AUTO: 4.28 X10E6/UL (ref 4.14–5.8)
SODIUM SERPL-SCNC: 141 MMOL/L (ref 134–144)
TRIGL SERPL-MCNC: 106 MG/DL (ref 0–149)
TSH SERPL DL<=0.005 MIU/L-ACNC: 2.02 UIU/ML (ref 0.45–4.5)
VIT B12 SERPL-MCNC: 748 PG/ML (ref 232–1245)
VLDLC SERPL CALC-MCNC: 19 MG/DL (ref 5–40)
WBC # BLD AUTO: 7.9 X10E3/UL (ref 3.4–10.8)

## (undated) DEVICE — GLV SURG BIOGEL LTX PF 8

## (undated) DEVICE — STERILE ULTRASOUND GEL, SAFEWRAP: Brand: ECOVUE

## (undated) DEVICE — GOWN,NON-REINFORCED,SIRUS,SET IN SLV,XXL: Brand: MEDLINE

## (undated) DEVICE — PAD ARMBRD SURG CONVOL 7.5X20X2IN

## (undated) DEVICE — CONTAINER,SPECIMEN,OR STERILE,4OZ: Brand: MEDLINE

## (undated) DEVICE — SYR LL TP 10ML STRL

## (undated) DEVICE — SHEET,DRAPE,40X58,STERILE: Brand: MEDLINE

## (undated) DEVICE — NDL SPINE 22G 7IN BLK

## (undated) DEVICE — MAX-CORE® DISPOSABLE CORE BIOPSY INSTRUMENT, 18G X 25CM: Brand: MAX-CORE

## (undated) DEVICE — GUIDE NDL BIOP BIPLANE 17G STRL 1P/U

## (undated) DEVICE — CVR TRANSD NEOGUARD 2X30CM LF STRL